# Patient Record
Sex: FEMALE | Race: WHITE | NOT HISPANIC OR LATINO | Employment: FULL TIME | URBAN - METROPOLITAN AREA
[De-identification: names, ages, dates, MRNs, and addresses within clinical notes are randomized per-mention and may not be internally consistent; named-entity substitution may affect disease eponyms.]

---

## 2017-03-14 ENCOUNTER — HOSPITAL ENCOUNTER (OUTPATIENT)
Dept: MAMMOGRAPHY | Facility: CLINIC | Age: 57
Discharge: HOME | End: 2017-03-14
Attending: FAMILY MEDICINE | Admitting: FAMILY MEDICINE
Payer: COMMERCIAL

## 2017-03-14 DIAGNOSIS — Z12.31 VISIT FOR SCREENING MAMMOGRAM: ICD-10-CM

## 2017-03-14 PROCEDURE — G0202 SCR MAMMO BI INCL CAD: HCPCS

## 2017-03-16 ENCOUNTER — TELEPHONE (OUTPATIENT)
Dept: FAMILY MEDICINE | Facility: CLINIC | Age: 57
End: 2017-03-16

## 2017-03-16 ENCOUNTER — HOSPITAL ENCOUNTER (OUTPATIENT)
Dept: ULTRASOUND IMAGING | Facility: CLINIC | Age: 57
Discharge: HOME | End: 2017-03-16
Attending: FAMILY MEDICINE | Admitting: FAMILY MEDICINE
Payer: COMMERCIAL

## 2017-03-16 DIAGNOSIS — R92.8 ABNORMAL MAMMOGRAM: ICD-10-CM

## 2017-03-16 PROCEDURE — 76642 ULTRASOUND BREAST LIMITED: CPT | Mod: RT

## 2017-03-16 NOTE — TELEPHONE ENCOUNTER
Reason for Call:  Other orders     Detailed comments: Diagnostics is calling because they need and MD to sign the Mammo orders they have placed   For breast ultrasound, pt diagnostic mammogram was abnormal      Phone Number Patient can be reached at: Other phone number:  [phone] Ambreen Sellers Time: any     Can we leave a detailed message on this number? YES    Call taken on 3/16/2017 at 8:09 AM by Sydnee Eaton

## 2017-03-17 ENCOUNTER — APPOINTMENT (OUTPATIENT)
Dept: GENERAL RADIOLOGY | Facility: CLINIC | Age: 57
End: 2017-03-17
Attending: PHYSICIAN ASSISTANT
Payer: COMMERCIAL

## 2017-03-17 ENCOUNTER — HOSPITAL ENCOUNTER (EMERGENCY)
Facility: CLINIC | Age: 57
Discharge: HOME | End: 2017-03-17
Attending: PHYSICIAN ASSISTANT | Admitting: PHYSICIAN ASSISTANT
Payer: COMMERCIAL

## 2017-03-17 VITALS
TEMPERATURE: 97.9 F | WEIGHT: 165 LBS | SYSTOLIC BLOOD PRESSURE: 116 MMHG | BODY MASS INDEX: 29.23 KG/M2 | OXYGEN SATURATION: 95 % | DIASTOLIC BLOOD PRESSURE: 83 MMHG | RESPIRATION RATE: 17 BRPM

## 2017-03-17 DIAGNOSIS — J20.9 ACUTE BRONCHITIS WITH SYMPTOMS > 10 DAYS: ICD-10-CM

## 2017-03-17 LAB
INTERNAL QC OK POCT: YES
S PYO AG THROAT QL IA.RAPID: NEGATIVE

## 2017-03-17 PROCEDURE — 87081 CULTURE SCREEN ONLY: CPT | Performed by: PHYSICIAN ASSISTANT

## 2017-03-17 PROCEDURE — 71020 XR CHEST 2 VW: CPT

## 2017-03-17 PROCEDURE — 99213 OFFICE O/P EST LOW 20 MIN: CPT | Performed by: PHYSICIAN ASSISTANT

## 2017-03-17 PROCEDURE — 99214 OFFICE O/P EST MOD 30 MIN: CPT | Mod: 25

## 2017-03-17 PROCEDURE — 87880 STREP A ASSAY W/OPTIC: CPT | Performed by: PHYSICIAN ASSISTANT

## 2017-03-17 RX ORDER — ALBUTEROL SULFATE 90 UG/1
2 AEROSOL, METERED RESPIRATORY (INHALATION) EVERY 4 HOURS PRN
Qty: 1 INHALER | Refills: 0 | Status: SHIPPED | OUTPATIENT
Start: 2017-03-17 | End: 2017-03-28

## 2017-03-17 RX ORDER — BENZONATATE 100 MG/1
100 CAPSULE ORAL 3 TIMES DAILY PRN
Qty: 42 CAPSULE | Refills: 0 | Status: SHIPPED | OUTPATIENT
Start: 2017-03-17 | End: 2017-03-28

## 2017-03-17 RX ORDER — DOXYCYCLINE 100 MG/1
100 CAPSULE ORAL 2 TIMES DAILY
Qty: 20 CAPSULE | Refills: 0 | Status: SHIPPED | OUTPATIENT
Start: 2017-03-17 | End: 2017-03-27

## 2017-03-17 RX ORDER — PREDNISONE 20 MG/1
TABLET ORAL
Qty: 10 TABLET | Refills: 0 | Status: SHIPPED | OUTPATIENT
Start: 2017-03-17 | End: 2017-03-28

## 2017-03-17 NOTE — ED AVS SNAPSHOT
[facility] Emergency Department    [address] [address]    Phone:  [phone]    Fax:  [phone]                                       Ivania Antonio   MRN: [id number]    Department:  [facility] Emergency Department   Date of Visit:  3/17/2017           After Visit Summary Signature Page     I have received my discharge instructions, and my questions have been answered. I have discussed any challenges I see with this plan with the nurse or doctor.    ..........................................................................................................................................  Patient/Patient Representative Signature      ..........................................................................................................................................  Patient Representative Print Name and Relationship to Patient    ..................................................               ................................................  Date                                            Time    ..........................................................................................................................................  Reviewed by Signature/Title    ...................................................              ..............................................  Date                                                            Time

## 2017-03-17 NOTE — ED AVS SNAPSHOT
[facility] Emergency Department    [address] [address]    Phone:  [phone]    Fax:  [phone]                                       Ivania Antonio   MRN: [id number]    Department:  [facility] Emergency Department   Date of Visit:  3/17/2017           Patient Information     Date Of Birth          1960        Your diagnoses for this visit were:     Acute bronchitis with symptoms > 10 days        You were seen by Peri Rodriguez PA-C.      Follow-up Information     Follow up with Elizabeth Smith MD In 5 days.    Specialty:  Family Practice    Why:  if no improvement or sooner if new or worsening symptoms     Contact information:    [facility] MED  [address] [address]  [phone]          Discharge Instructions         Bronchitis, Viral (Adult)    You have a viral bronchitis. Bronchitis is inflammation and swelling of the lining of the lungs. This is often caused by an infection. Symptoms include a dry, hacking cough that is worse at night. The cough may bring up yellow-green mucus. You may also feel short of breath or wheeze. Other symptoms may include tiredness, chest discomfort, and chills.  Bronchitis that is caused by a virus is not treated with antibiotics. Instead, medicines may be given to help relieve symptoms. Symptoms can last up to 2 weeks, although the cough may last much longer.  This illness is contagious during the first few days and is spread through the air by coughing and sneezing, or by direct contact (touching the sick person and then touching your own eyes, nose, or mouth).  Most viral illnesses resolve within 10 to 14 days with rest and simple home remedies, although they may sometimes last for several weeks.  Home care    If symptoms are severe, rest at home for the first 2 to 3 days. When you go back to your usual activities, don't let yourself get too tired.    Do not smoke. Also avoid being exposed to secondhand  smoke.    You may use over-the-counter medicine to control fever or pain, unless another pain medicine was prescribed. (Note: If you have chronic liver or kidney disease or have ever had a stomach ulcer or gastrointestinal bleeding, talk with your healthcare provider before using these medicines. Also talk to your provider if you are taking medicine to prevent blood clots.) Aspirin should never be given to anyone younger than 18 years of age who is ill with a viral infection or fever. It may cause severe liver or brain damage.    Your appetite may be poor, so a light diet is fine. Avoid dehydration by drinking 6 to 8 glasses of fluids per day (such as water, soft drinks, sports drinks, juices, tea, or soup). Extra fluids will help loosen secretions in the nose and lungs.    Over-the-counter cough, cold, and sore-throat medicines will not shorten the length of the illness, but they may help to reduce symptoms. (Note: Do not use decongestants if you have high blood pressure.)  Follow-up care  Follow up with your healthcare provider, or as advised.  If you had an X-ray or ECG (electrocardiogram), a specialist will review it. You will be notified of any new findings that may affect your care.  Note: If you are age 65 or older, or if you have a chronic lung disease or condition that affects your immune system, or you smoke, talk to your healthcare provider about having pneumococcal vaccinations and a yearly influenza vaccination (flu shot).  When to seek medical advice   Call your healthcare provider right away if any of these occur:    Fever of 100.4 F (38 C) or higher    Coughing up increased amounts of colored sputum    Weakness, drowsiness, headache, facial pain, ear pain, or a stiff neck  Call 911, or get immediate medical care  Contact emergency services right away if any of these occur:    Coughing up blood    Worsening weakness, drowsiness, headache, or stiff neck    Trouble breathing, wheezing, or pain with  breathing    1511-3237 [employer]. [address] [place] [address], [place] [address]. All rights reserved. This information is not intended as a substitute for professional medical care. Always follow your healthcare professional's instructions.          Future Appointments        Provider Department Dept Phone Center    3/21/2017 7:00 AM Elizabeth Smith MD [facility] [phone] [facility]      24 Hour Appointment Hotline       To make an appointment at any [facility], call [phone] ([phone]). If you don't have a family doctor or clinic, we will help you find one. [facility] are conveniently located to serve the needs of you and your family.             Review of your medicines      START taking        Dose / Directions Last dose taken    albuterol 108 (90 BASE) MCG/ACT Inhaler   Commonly known as:  albuterol   Dose:  2 puff   Quantity:  1 Inhaler        Inhale 2 puffs into the lungs every 4 hours as needed for shortness of breath / dyspnea   Refills:  0        benzonatate 100 MG capsule   Commonly known as:  TESSALON   Dose:  100 mg   Quantity:  42 capsule        Take 1 capsule (100 mg) by mouth 3 times daily as needed   Refills:  0        doxycycline 100 MG capsule   Commonly known as:  VIBRAMYCIN   Dose:  100 mg   Quantity:  20 capsule        Take 1 capsule (100 mg) by mouth 2 times daily for 10 days   Refills:  0        predniSONE 20 MG tablet   Commonly known as:  DELTASONE   Quantity:  10 tablet        Take two tablets (= 40mg) each day for 5 (five) days   Refills:  0          Our records show that you are taking the medicines listed below. If these are incorrect, please call your family doctor or clinic.        Dose / Directions Last dose taken    MULTIVITAMINS PO   Dose:  1 tablet        Take 1 tablet by mouth daily   Refills:  0                Prescriptions were sent or printed at these locations (4 Prescriptions)                   [facility] [place] -  [place], [place] - [address]   [address] [place] [address]    Telephone:  [phone]   Fax:  [phone]   Hours:                  E-Prescribed (3 of 4)         albuterol (ALBUTEROL) 108 (90 BASE) MCG/ACT Inhaler               benzonatate (TESSALON) 100 MG capsule               predniSONE (DELTASONE) 20 MG tablet                 Printed at Department/Unit printer (1 of 4)         doxycycline (VIBRAMYCIN) 100 MG capsule                Procedures and tests performed during your visit     Chest XR,  PA & LAT    Rapid strep group A screen POCT      Orders Needing Specimen Collection     Ordered          03/17/17 1319  Beta strep group A r/o culture - ROUTINE, Prio: Routine, Needs to be Collected     Scheduled Task Status   03/17/17 1320 Collect Beta strep group A r/o culture Open   Order Class:  PCU Collect                  Pending Results     Date and Time Order Name Status Description    3/17/2017 1307 Chest XR,  PA & LAT In process             Pending Culture Results     No orders found from 3/15/2017 to 3/18/2017.             Test Results from your hospital stay     3/17/2017  1:19 PM - Deisi Barraza LPN      Component Results     Component Value Ref Range & Units Status    Rapid Strep A Screen NEGATIVE neg Final    Internal QC OK Yes  Final         3/17/2017  1:11 PM - Katie Ray      Result not yet available     Exam Ended                Thank you for choosing [facility]       Thank you for choosing [facility] for your care. Our goal is always to provide you with excellent care. Hearing back from our patients is one way we can continue to improve our services. Please take a few minutes to complete the written survey that you may receive in the mail after you visit with us. Thank you!        [employer] Information     [employer] gives you secure access to your electronic health record. If you see a primary care provider, you can also send messages to your care team and make appointments. If you  have questions, please call your primary care clinic.  If you do not have a primary care provider, please call [phone] and they will assist you.        Care EveryWhere ID     This is your Care EveryWhere ID. This could be used by other organizations to access your [facility] medical records  [id number]        After Visit Summary       This is your record. Keep this with you and show to your community pharmacist(s) and doctor(s) at your next visit.

## 2017-03-17 NOTE — ED PROVIDER NOTES
History     Chief Complaint   Patient presents with     Cough     started 2 wks ago, not getting better      Pharyngitis     HPI  Ivania Antonio is a 56 year old female who is urgent care with concerns over 2 week history of cough.  She initially complains of sore throat, nasal congestion, intermittent fever up to 100 orally, shortness of breath and wheezing.  She has had some nausea however she is unsure if this is due to illness or side effect of medications that she subsequently control her symptoms.  She has not any actual vomiting, diarrhea or abdominal pain.  He is attempted to treat with Benadryl, OTC Vicks cold medication.  She denies any history of asthma, COPD or other pitting-related disorders.  She is a nonsmoker.  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    Past Medical History   Diagnosis Date     ASCUS with positive high risk HPV 4/5/2015     3/25/2015:Pap--ASCUS, +HR HPV. Plan [place]-      H/O colposcopy with cervical biopsy 4/17/2015     Bx & ECC - Negative     Hypertrophy of breast         No current facility-administered medications on file prior to encounter.   Current Outpatient Prescriptions on File Prior to Encounter:  Multiple Vitamin (MULTIVITAMINS PO) Take 1 tablet by mouth daily      I have reviewed the Medications, Allergies, Past Medical and Surgical History, and Social History in the Epic system.    Review of Systems  CONSTITUTIONAL:POSITIVE for intermittent fever up to 100.0 orally, chills, myalgias  INTEGUMENTARY/SKIN: NEGATIVE for worrisome rashes, moles or lesions  EYES: NEGATIVE for vision changes or irritation  ENT/MOUTH: POSITIVE for nasal congestion and sore throat NEGATIVE for ear pain   RESP:POSITIVE for cough, shortness of breath and wheezing   GI: POSITIVE for nausea NEGATIVE for vomiting, diarrhea or abdominal pain   Physical Exam   /83  Temp 97.9  F (36.6  C) (Oral)  Resp 17  Wt 74.8 kg (165 lb)  LMP 10/01/2012  SpO2 95%  BMI  29.23 kg/m2  Physical Exam  GENERAL APPEARANCE: alert and no distress  EYES: EOMI,  PERRL, conjunctiva clear  HENT: ear canals and TM's normal.  Nasal mucosa moist.  Posterior pharynx nonerythematous exudate  NECK: supple, nontender, no lymphadenopathy  RESP:fine crackles at bases that clear minimally with coughing, no wheezing, frequent cough present   CV: regular rates and rhythm, normal S1 S2, no murmur noted  SKIN: no suspicious lesions or rashes  ED Course     ED Course     Procedures        Critical Care time:  none            Labs Ordered and Resulted from Time of ED Arrival Up to the Time of Departure from the ED   RAPID STREP GROUP A SCREEN POCT   BETA STREP GROUP A CULTURE     Results for orders placed or performed during the hospital encounter of 03/17/17   Chest XR,  PA & LAT    Narrative    XR CHEST 2 VW 3/17/2017 1:19 PM    HISTORY: Cough.    COMPARISON: None.    FINDINGS: No airspace consolidation, pleural effusion or pneumothorax.  Normal heart size.      Impression    IMPRESSION: No acute cardiopulmonary abnormality.    JUAN BOWMAN MD     Assessments & Plan (with Medical Decision Making)     I have reviewed the nursing notes.    I have reviewed the findings, diagnosis, plan and need for follow up with the patient.  New Prescriptions    ALBUTEROL (ALBUTEROL) 108 (90 BASE) MCG/ACT INHALER    Inhale 2 puffs into the lungs every 4 hours as needed for shortness of breath / dyspnea    BENZONATATE (TESSALON) 100 MG CAPSULE    Take 1 capsule (100 mg) by mouth 3 times daily as needed    DOXYCYCLINE (VIBRAMYCIN) 100 MG CAPSULE    Take 1 capsule (100 mg) by mouth 2 times daily for 10 days    PREDNISONE (DELTASONE) 20 MG TABLET    Take two tablets (= 40mg) each day for 5 (five) days     Final diagnoses:   Acute bronchitis with symptoms > 10 days     56-year-old female presents to urgent care with concerns over 2 week history of cough accompanied by sore throat, intermittent fever up to 100.0 orally.  She was  tachycardic upon arrival however heart rate had normalized by time of examination.  Remainder of vital signs were within normal limits.  Physical exam findings as described above.  As part of evaluation she did have negative rapid strep test with culture pending at time of discharge.  Chest x-ray was negative for acute infiltrate, pneumothorax, pleural effusion or change in cardio pulmonary vasculature.  Symptoms are most consistent with bronchitis.  Differential also included viral URI, would consider possibility of influenza given presence of fever.  I do not suspect bacterial sinusitis,  Pertussis, PE or CHF at this time.  Patient was instructed to gently palpated in the viral infection and she was discharged home stable with injections for symptomatic treatment with albuterol, Tessalon and prednisone.  Given duration of her symptoms, I did also agree to give her prescription for doxycycline to be started only if no improvement within the next 3-5 days.  She was instructed to follow-up with primary care provider improved within the next week.  Signs of respiratory distress, worrisome reasons to return to the ER/UC sooner discussed.    Disclaimer: This note consists of symbols derived from keyboarding, dictation, and/or voice recognition software. As a result, there may be errors in the script that have gone undetected.  Please consider this when interpreting information found in the chart.      3/17/2017   [facility] EMERGENCY DEPARTMENT     Peri Rodriguez PA-C  03/17/17 2472

## 2017-03-17 NOTE — DISCHARGE INSTRUCTIONS

## 2017-03-17 NOTE — LETTER
[facility] EMERGENCY DEPARTMENT  [address] [address]  Phone: [phone]  Fax: [phone]    March 17, 2017        Ivania Antonio  [address] [place] [address]          To whom it may concern:    RE: Ivania Redd was evaluated in the urgent care for illness 3/17/17. She may return to activity only as toelrated as long as she remains afebrile with a temp less than 100.0 orally.    Please contact me for questions or concerns.      Sincerely,        Peri Rodriguez PA-C

## 2017-03-19 LAB
BACTERIA SPEC CULT: NORMAL
MICRO REPORT STATUS: NORMAL
SPECIMEN SOURCE: NORMAL

## 2017-03-28 ENCOUNTER — OFFICE VISIT (OUTPATIENT)
Dept: FAMILY MEDICINE | Facility: CLINIC | Age: 57
End: 2017-03-28
Payer: COMMERCIAL

## 2017-03-28 VITALS
DIASTOLIC BLOOD PRESSURE: 74 MMHG | BODY MASS INDEX: 30.37 KG/M2 | HEIGHT: 63 IN | HEART RATE: 80 BPM | WEIGHT: 171.4 LBS | SYSTOLIC BLOOD PRESSURE: 101 MMHG

## 2017-03-28 DIAGNOSIS — Z83.3 FAMILY HISTORY OF DIABETES MELLITUS: ICD-10-CM

## 2017-03-28 DIAGNOSIS — Z11.59 NEED FOR HEPATITIS C SCREENING TEST: ICD-10-CM

## 2017-03-28 DIAGNOSIS — Z00.00 ROUTINE GENERAL MEDICAL EXAMINATION AT A HEALTH CARE FACILITY: Primary | ICD-10-CM

## 2017-03-28 LAB
ANION GAP SERPL CALCULATED.3IONS-SCNC: 6 MMOL/L (ref 3–14)
BUN SERPL-MCNC: 17 MG/DL (ref 7–30)
CALCIUM SERPL-MCNC: 9.3 MG/DL (ref 8.5–10.1)
CHLORIDE SERPL-SCNC: 104 MMOL/L (ref 94–109)
CO2 SERPL-SCNC: 31 MMOL/L (ref 20–32)
CREAT SERPL-MCNC: 0.87 MG/DL (ref 0.52–1.04)
GFR SERPL CREATININE-BSD FRML MDRD: 67 ML/MIN/1.7M2
GLUCOSE SERPL-MCNC: 96 MG/DL (ref 70–99)
HCV AB SERPL QL IA: NORMAL
POTASSIUM SERPL-SCNC: 4.3 MMOL/L (ref 3.4–5.3)
SODIUM SERPL-SCNC: 141 MMOL/L (ref 133–144)

## 2017-03-28 PROCEDURE — 87624 HPV HI-RISK TYP POOLED RSLT: CPT | Performed by: FAMILY MEDICINE

## 2017-03-28 PROCEDURE — 36415 COLL VENOUS BLD VENIPUNCTURE: CPT | Performed by: FAMILY MEDICINE

## 2017-03-28 PROCEDURE — 80048 BASIC METABOLIC PNL TOTAL CA: CPT | Performed by: FAMILY MEDICINE

## 2017-03-28 PROCEDURE — 99396 PREV VISIT EST AGE 40-64: CPT | Performed by: FAMILY MEDICINE

## 2017-03-28 PROCEDURE — 82274 ASSAY TEST FOR BLOOD FECAL: CPT | Performed by: FAMILY MEDICINE

## 2017-03-28 PROCEDURE — G0145 SCR C/V CYTO,THINLAYER,RESCR: HCPCS | Performed by: FAMILY MEDICINE

## 2017-03-28 PROCEDURE — 86803 HEPATITIS C AB TEST: CPT | Performed by: FAMILY MEDICINE

## 2017-03-28 NOTE — MR AVS SNAPSHOT
After Visit Summary   3/28/2017    Ivania Antonio    MRN: [id number]           Patient Information     Date Of Birth          1960        Visit Information        Provider Department      3/28/2017 7:00 AM Elizabeth Smith MD [facility]        Today's Diagnoses     Routine general medical examination at a health care facility    -  1    Family history of diabetes mellitus        Need for hepatitis C screening test          Care Instructions      Preventive Health Recommendations  Female Ages 50 - 64    Yearly exam: See your health care provider every year in order to  o Review health changes.   o Discuss preventive care.    o Review your medicines if your doctor has prescribed any.      Get a Pap test every three years (unless you have an abnormal result and your provider advises testing more often).    If you get Pap tests with HPV test, you only need to test every 5 years, unless you have an abnormal result.     You do not need a Pap test if your uterus was removed (hysterectomy) and you have not had cancer.    You should be tested each year for STDs (sexually transmitted diseases) if you're at risk.     Have a mammogram every 1 to 2 years.    Have a colonoscopy at age 50, or have a yearly FIT test (stool test). These exams screen for colon cancer.      Have a cholesterol test every 5 years, or more often if advised.    Have a diabetes test (fasting glucose) every three years. If you are at risk for diabetes, you should have this test more often.     If you are at risk for osteoporosis (brittle bone disease), think about having a bone density scan (DEXA).    Shots: Get a flu shot each year. Get a tetanus shot every 10 years.    Nutrition:     Eat at least 5 servings of fruits and vegetables each day.    Eat whole-grain bread, whole-wheat pasta and brown rice instead of white grains and rice.    Talk to your provider about Calcium and Vitamin D.     Lifestyle    Exercise at  "least 150 minutes a week (30 minutes a day, 5 days a week). This will help you control your weight and prevent disease.    Limit alcohol to one drink per day.    No smoking.     Wear sunscreen to prevent skin cancer.     See your dentist every six months for an exam and cleaning.    See your eye doctor every 1 to 2 years.          Follow-ups after your visit        Who to contact     If you have questions or need follow up information about today's clinic visit or your schedule please contact [facility] directly at [phone].  Normal or non-critical lab and imaging results will be communicated to you by [employer], letter or phone within 4 business days after the clinic has received the results. If you do not hear from us within 7 days, please contact the clinic through [employer] or phone. If you have a critical or abnormal lab result, we will notify you by phone as soon as possible.  Submit refill requests through [employer] or call your pharmacy and they will forward the refill request to us. Please allow 3 business days for your refill to be completed.          Additional Information About Your Visit        [employer] Information     [employer] gives you secure access to your electronic health record. If you see a primary care provider, you can also send messages to your care team and make appointments. If you have questions, please call your primary care clinic.  If you do not have a primary care provider, please call [phone] and they will assist you.        Care EveryWhere ID     This is your Care EveryWhere ID. This could be used by other organizations to access your [facility] medical records  [id number]        Your Vitals Were     Pulse Height Last Period BMI (Body Mass Index)          80 5' 3\" (1.6 m) 10/01/2012 30.36 kg/m2         Blood Pressure from Last 3 Encounters:   03/28/17 101/74   03/17/17 116/83   12/07/15 145/86    Weight from Last 3 Encounters:   03/28/17 171 lb 6.4 oz (77.7 kg) "   03/17/17 165 lb (74.8 kg)   04/17/15 183 lb (83 kg)              We Performed the Following     Basic metabolic panel     Hepatitis C antibody        Primary Care Provider Office Phone # Fax #    Elizabeth Allie Smith [phone]       [facility] [address] [address]        Thank you!     Thank you for choosing [facility]  for your care. Our goal is always to provide you with excellent care. Hearing back from our patients is one way we can continue to improve our services. Please take a few minutes to complete the written survey that you may receive in the mail after your visit with us. Thank you!             Your Updated Medication List - Protect others around you: Learn how to safely use, store and throw away your medicines at www.disposemymeds.org.          This list is accurate as of: 3/28/17  7:37 AM.  Always use your most recent med list.                   Brand Name Dispense Instructions for use    DOXYCYCLINE HYCLATE PO      Take 100 mg by mouth 2 times daily       MULTIVITAMINS PO      Take 1 tablet by mouth daily

## 2017-03-28 NOTE — NURSING NOTE
"Chief Complaint   Patient presents with     Gyn Exam     update pap smear     Physical       Initial /74  Pulse 80  Ht 5' 3\" (1.6 m)  Wt 171 lb 6.4 oz (77.7 kg)  LMP 10/01/2012  BMI 30.36 kg/m2 Estimated body mass index is 30.36 kg/(m^2) as calculated from the following:    Height as of this encounter: 5' 3\" (1.6 m).    Weight as of this encounter: 171 lb 6.4 oz (77.7 kg).  Medication Reconciliation: complete  "

## 2017-03-28 NOTE — PROGRESS NOTES
Answers for HPI/ROS submitted by the patient on 3/24/2017   Annual Exam:  Getting at least 3 servings of Calcium per day:: Yes  Bi-annual eye exam:: Yes  Dental care twice a year:: Yes  Sleep apnea or symptoms of sleep apnea:: None  Diet:: Regular (no restrictions)  Frequency of exercise:: 4-5 days/week  Taking medications regularly:: Yes  Medication side effects:: Not applicable  Additional concerns today:: No  Q1: Little interest or pleasure in doing things: 0=Not at all  Q2: Feeling down, depressed or hopeless: 0=Not at all  PHQ-2 Score: 0  Duration of exercise:: 30-45 minutes     SUBJECTIVE:     CC: Ivania Antonio is an 56 year old woman who presents for preventive health visit.       Today's PHQ-2 Score:   PHQ-2 ( 1999 [employer]) 3/28/2017 3/24/2017   Q1: Little interest or pleasure in doing things 0 -   Q2: Feeling down, depressed or hopeless 0 -   PHQ-2 Score 0 -   Little interest or pleasure in doing things - Not at all   Feeling down, depressed or hopeless - Not at all   PHQ-2 Score - 0         Social History   Substance Use Topics     Smoking status: Never Smoker     Smokeless tobacco: Never Used     Alcohol use Yes      Comment: ocass. 4 beers a month         Recent Labs   Lab Test  03/25/15   0724   CHOL  154   HDL  68   LDL  73   TRIG  63   CHOLHDLRATIO  2.3     The 10-year ASCVD risk score (Strangtamanna WALDEN Jr, et al., 2013) is: 0.8%    Values used to calculate the score:      Age: 56 years      Sex: Female      Is Non- : No      Diabetic: No      Tobacco smoker: No      Systolic Blood Pressure: 101 mmHg      Is BP treated: No      HDL Cholesterol: 68 mg/dL      Total Cholesterol: 154 mg/dL    Reviewed orders with patient.  Reviewed health maintenance and updated orders accordingly - Yes    Mammo Decision Support:  Patient over age 50, mutual decision to screen reflected in health maintenance.    Pertinent mammograms are reviewed under the imaging tab.  History of abnormal Pap smear:    Last 3 Pap Results:   PAP (no units)   Date Value   2015 ASC-US (A)       Reviewed and updated as needed this visit by clinical staff  Tobacco  Allergies  Med Hx  Surg Hx  Fam Hx  Soc Hx        Reviewed and updated as needed this visit by Provider        Past Medical History:   Diagnosis Date     ASCUS with positive high risk HPV 2015    3/25/2015:Pap--ASCUS, +HR HPV. Plan [place]-      H/O colposcopy with cervical biopsy 2015    Bx & ECC - Negative     Hypertrophy of breast       Past Surgical History:   Procedure Laterality Date     BIOPSY BREAST Left 2015    Procedure: BIOPSY BREAST;  Surgeon: Otoniel Jimenez MD;  Location: [facility]     GALLBLADDER SURGERY  age 31    laparoscopic     MAMMOPLASTY REDUCTION BILATERAL  2014    Procedure: MAMMOPLASTY REDUCTION BILATERAL;  Surgeon: Sue Guerra MD;  Location: [facility] OR     Obstetric History       T0      TAB0   SAB0   E0   M0   L0           ROS:  C: NEGATIVE for fever, chills, change in weight  I: NEGATIVE for worrisome rashes, moles or lesions  E: NEGATIVE for vision changes or irritation  ENT: NEGATIVE for ear, mouth and throat problems  R: NEGATIVE for significant cough or SOB  B: NEGATIVE for masses, tenderness or discharge  CV: NEGATIVE for chest pain, palpitations or peripheral edema  GI: NEGATIVE for nausea, abdominal pain, heartburn, or change in bowel habits  : NEGATIVE for unusual urinary or vaginal symptoms. No vaginal bleeding.  M: NEGATIVE for significant arthralgias or myalgia  N: NEGATIVE for weakness, dizziness or paresthesias  P: NEGATIVE for changes in mood or affect     BP Readings from Last 3 Encounters:   17 101/74   17 116/83   12/07/15 145/86    Wt Readings from Last 3 Encounters:   17 171 lb 6.4 oz (77.7 kg)   17 165 lb (74.8 kg)   04/17/15 183 lb (83 kg)                  Patient Active Problem List   Diagnosis     Hypertrophy of breast     CARDIOVASCULAR SCREENING;  "LDL GOAL LESS THAN 160     Scar condition and fibrosis of skin     Lump or mass in breast     S/P bilateral breast reduction     History of bilateral breast reduction surgery     ASCUS with positive high risk HPV     Vaginal atrophy     Past Surgical History:   Procedure Laterality Date     BIOPSY BREAST Left 4/2/2015    Procedure: BIOPSY BREAST;  Surgeon: Otoniel Jimenez MD;  Location: [facility]     GALLBLADDER SURGERY  age 31    laparoscopic     MAMMOPLASTY REDUCTION BILATERAL  5/20/2014    Procedure: MAMMOPLASTY REDUCTION BILATERAL;  Surgeon: Sue Guerra MD;  Location: [facility]       Social History   Substance Use Topics     Smoking status: Never Smoker     Smokeless tobacco: Never Used     Alcohol use Yes      Comment: ocass. 4 beers a month     Family History   Problem Relation Age of Onset     CANCER Mother      lung, smoker     HEART DISEASE Father      DIABETES Maternal Grandfather      HEART DISEASE Paternal Grandfather      HEART DISEASE Sister      DIABETES Sister      Myocardial Infarction Brother          Current Outpatient Prescriptions   Medication Sig Dispense Refill     DOXYCYCLINE HYCLATE PO Take 100 mg by mouth 2 times daily       Multiple Vitamin (MULTIVITAMINS PO) Take 1 tablet by mouth daily        Allergies   Allergen Reactions     Penicillins Rash     LW Reaction: Rash, Generalized     Recent Labs   Lab Test  03/28/17   0738  03/25/15   0724   LDL   --   73   HDL   --   68   TRIG   --   63   CR  0.87   --    GFRESTIMATED  67   --    GFRESTBLACK  82   --    POTASSIUM  4.3   --       OBJECTIVE:     /74  Pulse 80  Ht 5' 3\" (1.6 m)  Wt 171 lb 6.4 oz (77.7 kg)  LMP 10/01/2012  BMI 30.36 kg/m2  EXAM:  GENERAL APPEARANCE: healthy, alert and no distress  EYES: Eyes grossly normal to inspection, PERRL and conjunctivae and sclerae normal  HENT: ear canals and TM's normal, nose and mouth without ulcers or lesions, oropharynx clear and oral mucous membranes moist  NECK: no " "adenopathy, no asymmetry, masses, or scars and thyroid normal to palpation  RESP: lungs clear to auscultation - no rales, rhonchi or wheezes  BREAST: normal without masses, tenderness or nipple discharge and no palpable axillary masses or adenopathy  CV: regular rate and rhythm, normal S1 S2, no S3 or S4, no murmur, click or rub, no peripheral edema and peripheral pulses strong  ABDOMEN: soft, nontender, no hepatosplenomegaly, no masses and bowel sounds normal   (female): normal female external genitalia, normal urethral meatus, vaginal mucosal atrophy noted and normal cervix, adnexae, and uterus without masses.  MS: no musculoskeletal defects are noted and gait is age appropriate without ataxia  SKIN: no suspicious lesions or rashes  NEURO: Normal strength and tone, sensory exam grossly normal, mentation intact and speech normal  PSYCH: mentation appears normal and affect normal/bright    ASSESSMENT/PLAN:     1. Routine general medical examination at a health care facility  Low risk cervical cancer, low risk breast cancer.  - Pap imaged thin layer screen with HPV - recommended age 30 - 65 years (select HPV order below)  - HPV High Risk Types DNA Cervical    2. Family history of diabetes mellitus  Yearly glucose  - Basic metabolic panel    3. Need for hepatitis C screening test  - Hepatitis C antibody    COUNSELING:   Reviewed preventive health counseling, as reflected in patient instructions         reports that she has never smoked. She has never used smokeless tobacco.    Estimated body mass index is 30.36 kg/(m^2) as calculated from the following:    Height as of this encounter: 5' 3\" (1.6 m).    Weight as of this encounter: 171 lb 6.4 oz (77.7 kg).   Weight management plan: Discussed healthy diet and exercise guidelines and patient will follow up in 3 months in clinic to re-evaluate.    Counseling Resources:  ATP IV Guidelines  Pooled Cohorts Equation Calculator  Breast Cancer Risk Calculator  FRAX Risk " Assessment  ICSI Preventive Guidelines  Dietary Guidelines for Americans, 2010  USDA's MyPlate  ASA Prophylaxis  Lung CA Screening    Elizabeth Smith MD  [facility]

## 2017-03-30 LAB
COPATH REPORT: NORMAL
PAP: NORMAL

## 2017-03-31 LAB
FINAL DIAGNOSIS: NORMAL
HPV HR 12 DNA CVX QL NAA+PROBE: NEGATIVE
HPV16 DNA SPEC QL NAA+PROBE: NEGATIVE
HPV18 DNA SPEC QL NAA+PROBE: NEGATIVE
SPECIMEN DESCRIPTION: NORMAL

## 2017-04-02 LAB — HEMOCCULT STL QL IA: NEGATIVE

## 2017-04-17 ENCOUNTER — HOSPITAL ENCOUNTER (EMERGENCY)
Facility: CLINIC | Age: 57
Discharge: HOME | End: 2017-04-17
Attending: PHYSICIAN ASSISTANT | Admitting: PHYSICIAN ASSISTANT
Payer: COMMERCIAL

## 2017-04-17 VITALS
TEMPERATURE: 97.8 F | OXYGEN SATURATION: 99 % | RESPIRATION RATE: 18 BRPM | SYSTOLIC BLOOD PRESSURE: 122 MMHG | DIASTOLIC BLOOD PRESSURE: 84 MMHG

## 2017-04-17 DIAGNOSIS — J01.90 ACUTE SINUSITIS WITH SYMPTOMS > 10 DAYS: ICD-10-CM

## 2017-04-17 PROCEDURE — 99213 OFFICE O/P EST LOW 20 MIN: CPT | Performed by: PHYSICIAN ASSISTANT

## 2017-04-17 PROCEDURE — 99213 OFFICE O/P EST LOW 20 MIN: CPT

## 2017-04-17 RX ORDER — FLUTICASONE PROPIONATE 50 MCG
1-2 SPRAY, SUSPENSION (ML) NASAL DAILY PRN
Qty: 1 BOTTLE | Refills: 0 | Status: SHIPPED | OUTPATIENT
Start: 2017-04-17 | End: 2019-01-29

## 2017-04-17 RX ORDER — LEVOFLOXACIN 500 MG/1
500 TABLET, FILM COATED ORAL DAILY
Qty: 7 TABLET | Refills: 0 | Status: SHIPPED | OUTPATIENT
Start: 2017-04-17 | End: 2017-04-24

## 2017-04-17 RX ORDER — LEVOFLOXACIN 500 MG/1
500 TABLET, FILM COATED ORAL DAILY
Qty: 7 TABLET | Refills: 0 | Status: SHIPPED | OUTPATIENT
Start: 2017-04-17 | End: 2017-04-17 | Stop reason: CLARIF

## 2017-04-17 NOTE — ED AVS SNAPSHOT
[facility] Emergency Department    [address] [address]    Phone:  [phone]    Fax:  [phone]                                       Ivania Antonio   MRN: [id number]    Department:  [facility] Department   Date of Visit:  4/17/2017           Patient Information     Date Of Birth          1960        Your diagnoses for this visit were:     Acute sinusitis with symptoms > 10 days        You were seen by Mary Daniels PA-C.      Follow-up Information     Please follow up.    Why:  As needed, If symptoms worsen        Discharge Instructions       Use Medication as directed    Patient informed to rest, warm compresses over sinuses as needed, stay hydrated, cool humidifier, salt water gargles, and nasal saline sprays as needed.  Lots of rest and fluids.  Tylenol or ibuprofen as needed.    Return if any worsening symptoms or if not improving.  Follow up with primary care provider in 1-2 weeks if symptoms persist.    Go to Emergency Room if sx worsen or change, worst headache of life, visual changes, confusion, fevers > 104F occur.     Patient voiced understanding of instructions given.       24 Hour Appointment Hotline       To make an appointment at any [facility], call [phone] ([phone]). If you don't have a family doctor or clinic, we will help you find one. [facility] clinics are conveniently located to serve the needs of you and your family.             Review of your medicines      START taking        Dose / Directions Last dose taken    fluticasone 50 MCG/ACT spray   Commonly known as:  FLONASE   Dose:  1-2 spray   Quantity:  1 Bottle        Spray 1-2 sprays into both nostrils daily as needed for rhinitis or allergies   Refills:  0        levofloxacin 500 MG tablet   Commonly known as:  LEVAQUIN   Dose:  500 mg   Quantity:  7 tablet        Take 1 tablet (500 mg) by mouth daily for 7 days   Refills:  0          Our records show that you  are taking the medicines listed below. If these are incorrect, please call your family doctor or clinic.        Dose / Directions Last dose taken    DOXYCYCLINE HYCLATE PO   Dose:  100 mg        Take 100 mg by mouth 2 times daily   Refills:  0        MULTIVITAMINS PO   Dose:  1 tablet        Take 1 tablet by mouth daily   Refills:  0                Prescriptions were sent or printed at these locations (2 Prescriptions)                   [facility] [address] [address] [employer] - [place] [address] [address] [facility] [address]    Telephone:  [phone]   Fax:  [phone]   Hours:                  E-Prescribed (2 of 2)         levofloxacin (LEVAQUIN) 500 MG tablet               fluticasone (FLONASE) 50 MCG/ACT spray                Orders Needing Specimen Collection     None      Pending Results     No orders found from 4/15/2017 to 4/18/2017.            Pending Culture Results     No orders found from 4/15/2017 to 4/18/2017.            Test Results From Your Hospital Stay               Thank you for choosing [facility]       Thank you for choosing [facility] for your care. Our goal is always to provide you with excellent care. Hearing back from our patients is one way we can continue to improve our services. Please take a few minutes to complete the written survey that you may receive in the mail after you visit with us. Thank you!        [employer] Information     [employer] gives you secure access to your electronic health record. If you see a primary care provider, you can also send messages to your care team and make appointments. If you have questions, please call your primary care clinic.  If you do not have a primary care provider, please call [phone] and they will assist you.        Care EveryWhere ID     This is your Care EveryWhere ID. This could be used by other organizations to access your [facility] medical records  [id number]        After Visit Summary       This is your record. Keep this with  you and show to your community pharmacist(s) and doctor(s) at your next visit.

## 2017-04-17 NOTE — DISCHARGE INSTRUCTIONS
Use Medication as directed    Patient informed to rest, warm compresses over sinuses as needed, stay hydrated, cool humidifier, salt water gargles, and nasal saline sprays as needed.  Lots of rest and fluids.  Tylenol or ibuprofen as needed.    Return if any worsening symptoms or if not improving.  Follow up with primary care provider in 1-2 weeks if symptoms persist.    Go to Emergency Room if sx worsen or change, worst headache of life, visual changes, confusion, fevers > 104F occur.     Patient voiced understanding of instructions given.

## 2017-04-17 NOTE — ED AVS SNAPSHOT
[facility]    [address] [address]    Phone:  [phone]    Fax:  [phone]                                       Ivania Antonio   MRN: [id number]    Department:  [facility]   Date of Visit:  4/17/2017           After Visit Summary Signature Page     I have received my discharge instructions, and my questions have been answered. I have discussed any challenges I see with this plan with the nurse or doctor.    ..........................................................................................................................................  Patient/Patient Representative Signature      ..........................................................................................................................................  Patient Representative Print Name and Relationship to Patient    ..................................................               ................................................  Date                                            Time    ..........................................................................................................................................  Reviewed by Signature/Title    ...................................................              ..............................................  Date                                                            Time

## 2017-04-17 NOTE — ED PROVIDER NOTES
History     Chief Complaint   Patient presents with     Sinusitis     for 2 weeks      HPI  Ivania Antonio is a 56 year old female who presents to urgent care with...    ENT Symptoms             Symptoms: cc Present Absent Comment   Fever/Chills   x    Fatigue   x    Muscle Aches   x    Eye Irritation   x    Sneezing  x     Nasal Jonathan/Drg  x     Sinus Pressure/Pain  x     Loss of smell  x     Dental pain   x    Sore Throat   x    Swollen Glands   x    Ear Pain/Fullness   x    Cough  x     Wheeze   x    Chest Pain   x    Shortness of breath   x    Rash   x    Other   x  Sinus headache     Symptom duration:  2 weeks   Symptom severity:  mild and worsening   Treatments tried:  patient finished doxycyline 2 weeks ago for bronchitis   Contacts:  none     Problem list, Medication list, Allergies, and Medical/Social/Surgical histories reviewed in [facility] and updated as appropriate.    Review of Systems     All normal unless stated above     Physical Exam   BP: 122/84  Heart Rate: 80  Temp: 97.8  F (36.6  C)  Resp: 18  SpO2: 99 %  Physical Exam     Constitutional: healthy, alert and no distress   Cardiovascular: RRR. No murmurs, clicks gallops or rub  Respiratory: Lungs clear to auscultation bilaterally. No rales, rhonchi, wheezing appreciated. No accessory muscle use or retractions.   ENT: bilateral TM normal without fluid or infection, neck has bilateral anterior cervical nodes enlarged, throat normal without erythema or exudate, maxillary and frontal sinus tender, post nasal drip noted and nasal mucosa congested  SKIN: no suspicious lesions or rashes    No results found for this or any previous visit (from the past 24 hour(s)).    ED Course     ED Course     Procedures            Critical Care time:  none               Labs Ordered and Resulted from Time of ED Arrival Up to the Time of Departure from the ED - No data to display    Assessments & Plan (with Medical Decision Making)     I have reviewed the nursing  notes.    I have reviewed the findings, diagnosis, plan and need for follow up with the patient.    New Prescriptions    FLUTICASONE (FLONASE) 50 MCG/ACT SPRAY    Spray 1-2 sprays into both nostrils daily as needed for rhinitis or allergies    LEVOFLOXACIN (LEVAQUIN) 500 MG TABLET    Take 1 tablet (500 mg) by mouth daily for 7 days       Final diagnoses:   Acute sinusitis with symptoms > 10 days       4/17/2017   [facility] EMERGENCY DEPARTMENT     Mary Daniels PA-C  04/17/17 [id number]

## 2017-08-13 ENCOUNTER — HOSPITAL ENCOUNTER (EMERGENCY)
Facility: CLINIC | Age: 57
Discharge: HOME | End: 2017-08-13
Attending: NURSE PRACTITIONER | Admitting: NURSE PRACTITIONER
Payer: COMMERCIAL

## 2017-08-13 VITALS
OXYGEN SATURATION: 97 % | TEMPERATURE: 98.5 F | HEART RATE: 91 BPM | BODY MASS INDEX: 30.82 KG/M2 | RESPIRATION RATE: 14 BRPM | DIASTOLIC BLOOD PRESSURE: 99 MMHG | SYSTOLIC BLOOD PRESSURE: 155 MMHG | WEIGHT: 174 LBS

## 2017-08-13 DIAGNOSIS — J06.9 UPPER RESPIRATORY TRACT INFECTION, UNSPECIFIED TYPE: Primary | ICD-10-CM

## 2017-08-13 LAB
INTERNAL QC OK POCT: YES
S PYO AG THROAT QL IA.RAPID: NEGATIVE

## 2017-08-13 PROCEDURE — 87880 STREP A ASSAY W/OPTIC: CPT | Performed by: NURSE PRACTITIONER

## 2017-08-13 PROCEDURE — 87081 CULTURE SCREEN ONLY: CPT | Performed by: NURSE PRACTITIONER

## 2017-08-13 PROCEDURE — 99213 OFFICE O/P EST LOW 20 MIN: CPT | Performed by: NURSE PRACTITIONER

## 2017-08-13 PROCEDURE — 99213 OFFICE O/P EST LOW 20 MIN: CPT

## 2017-08-13 RX ORDER — LEVOFLOXACIN 500 MG/1
500 TABLET, FILM COATED ORAL DAILY
Qty: 7 TABLET | Refills: 0 | Status: SHIPPED | OUTPATIENT
Start: 2017-08-13 | End: 2017-08-20

## 2017-08-13 NOTE — ED AVS SNAPSHOT
[facility] Emergency Department    [address] [address]    Phone:  [phone]    Fax:  [phone]                                       Ivania Antonio   MRN: [id number]    Department:  [facility] Emergency Department   Date of Visit:  8/13/2017           After Visit Summary Signature Page     I have received my discharge instructions, and my questions have been answered. I have discussed any challenges I see with this plan with the nurse or doctor.    ..........................................................................................................................................  Patient/Patient Representative Signature      ..........................................................................................................................................  Patient Representative Print Name and Relationship to Patient    ..................................................               ................................................  Date                                            Time    ..........................................................................................................................................  Reviewed by Signature/Title    ...................................................              ..............................................  Date                                                            Time

## 2017-08-13 NOTE — ED PROVIDER NOTES
History     Chief Complaint   Patient presents with     Pharyngitis     cough cold sx ST X2 weeks.      HPI  Ivania Antonio is a 57 year old female who fever, swollen glands, earache, sore throat, intermittent chills ( in the afternoon ), nasal congestion that started this past Wednesday.  Pt states occasionally shortness of air when going up stairs.  Pt states the fever has been low grade and the highest is 100 and usually in the afternoon the temp is noted.  Pt reports she has had a hoarse voice for approximately 3 weeks. Pt denies problems eating, drinking, voiding, and stooling.  Pt denies seizures, rashes, recent tick bites.      I have reviewed the Medications, Allergies, Past Medical and Surgical History, and Social History in the Epic system.    Review of Systems  10 point ROS of systems including Constitutional, Eyes, Respiratory, Cardiovascular, Gastroenterology, Genitourinary, Integumentary, Muscularskeletal, Psychiatric were all negative except for pertinent positives noted in my HPI.    Physical Exam   BP: (!) 155/99  Pulse: 91  Temp: 98.5  F (36.9  C)  Resp: 14  Weight: 78.9 kg (174 lb)  SpO2: 97 %  Physical Exam   Constitutional: She appears well-developed and well-nourished. She is cooperative. She appears ill (mildly ill appearing).   HENT:   Head: Normocephalic and atraumatic.   Right Ear: Hearing, tympanic membrane, external ear and ear canal normal.   Left Ear: Hearing, tympanic membrane, external ear and ear canal normal.   Nose: Mucosal edema and rhinorrhea (mucopurulent) present. No nose lacerations or sinus tenderness. Right sinus exhibits no maxillary sinus tenderness and no frontal sinus tenderness. Left sinus exhibits no frontal sinus tenderness.   Mouth/Throat: Uvula is midline, oropharynx is clear and moist and mucous membranes are normal.   Neurological: She is alert.   Skin: She is not diaphoretic.       ED Course     ED Course     Procedures      Labs Ordered and Resulted from Time  of ED Arrival Up to the Time of Departure from the ED   RAPID STREP GROUP A SCREEN POCT     Results for orders placed or performed during the hospital encounter of 08/13/17   Rapid strep group A screen POCT   Result Value Ref Range    Rapid Strep A Screen negative neg    Internal QC OK Yes        Assessments & Plan (with Medical Decision Making)     I have reviewed the nursing notes.    I have reviewed the findings, diagnosis, plan and need for follow up with the patient.  Ivania Antonio is a 57 year old female who fever, swollen glands, earache, sore throat, intermittent chills ( in the afternoon ), nasal congestion that started this past Wednesday.  Pt states occasionally shortness of air when going up stairs.  Pt states the fever has been low grade and the highest is 100 and usually in the afternoon the temp is noted.  Pt reports she has had a hoarse voice for approximately 3 weeks. Pt denies problems eating, drinking, voiding, and stooling.  Pt denies seizures, rashes, recent tick bites.  Exam as noted above.  Due to presentation of viral type findings initially with change noted over the past few days will treat as bacterial.  Previous reaction to PCN was generalized swelling moderate to severe and chose levaquin for antibiotic then.  DDx:  bronchitis, pneumonia, Viral URI (ie.. Influenza), sinusitis    Discharge Medication List as of 8/13/2017  1:01 PM      START taking these medications    Details   levofloxacin (LEVAQUIN) 500 MG tablet Take 1 tablet (500 mg) by mouth daily for 7 days, Disp-7 tablet, R-0, E-Prescribe             Final diagnoses:   Upper respiratory tract infection, unspecified type       8/13/2017   [facility] EMERGENCY DEPARTMENT     Lenka Verdin, ISAAC CNP  08/13/17 [id number]

## 2017-08-13 NOTE — ED AVS SNAPSHOT
[facility] Emergency Department    [address] [address]    Phone:  [phone]    Fax:  [phone]                                       Ivania Antonio   MRN: [id number]    Department:  [facility] Emergency Department   Date of Visit:  8/13/2017           Patient Information     Date Of Birth          1960        Your diagnoses for this visit were:     Upper respiratory tract infection, unspecified type        You were seen by Lenka Veridn APRN CNP.      Follow-up Information     Follow up with Elizabeth Smith MD In 3 days.    Specialty:  Family Practice    Why:  If symptoms worsen, As needed    Contact information:    [address] [address]  [phone]        Discharge References/Attachments     COLDS, ADULT SELF-CARE FOR (ENGLISH)      24 Hour Appointment Hotline       To make an appointment at any [facility] clinic, call [phone] ([phone]). If you don't have a family doctor or clinic, we will help you find one. [facility] clinics are conveniently located to serve the needs of you and your family.             Review of your medicines      START taking        Dose / Directions Last dose taken    levofloxacin 500 MG tablet   Commonly known as:  LEVAQUIN   Dose:  500 mg   Quantity:  7 tablet        Take 1 tablet (500 mg) by mouth daily for 7 days   Refills:  0          Our records show that you are taking the medicines listed below. If these are incorrect, please call your family doctor or clinic.        Dose / Directions Last dose taken    DOXYCYCLINE HYCLATE PO   Dose:  100 mg        Take 100 mg by mouth 2 times daily   Refills:  0        fluticasone 50 MCG/ACT spray   Commonly known as:  FLONASE   Dose:  1-2 spray   Quantity:  1 Bottle        Spray 1-2 sprays into both nostrils daily as needed for rhinitis or allergies   Refills:  0        MULTIVITAMINS PO   Dose:  1 tablet        Take 1 tablet by mouth daily   Refills:  0                 Prescriptions were sent or printed at these locations (1 Prescription)                   [facility], [place] - [address]   [address], [place] [address]    Telephone:  [phone]   Fax:  [phone]   Hours:                  E-Prescribed (1 of 1)         levofloxacin (LEVAQUIN) 500 MG tablet                Procedures and tests performed during your visit     Beta strep group A r/o culture    Rapid strep group A screen POCT      Orders Needing Specimen Collection     None      Pending Results     No orders found from 8/11/2017 to 8/14/2017.            Pending Culture Results     No orders found from 8/11/2017 to 8/14/2017.            Pending Results Instructions     If you had any lab results that were not finalized at the time of your Discharge, you can call the ED Lab Result RN at [phone]. You will be contacted by this team for any positive Lab results or changes in treatment. The nurses are available 7 days a week from 10A to 6:30P.  You can leave a message 24 hours per day and they will return your call.        Test Results From Your Hospital Stay        8/13/2017 12:27 PM      Component Results     Component Value Ref Range & Units Status    Rapid Strep A Screen negative neg Final    Internal QC OK Yes  Final                Thank you for choosing [facility]       Thank you for choosing [facility] for your care. Our goal is always to provide you with excellent care. Hearing back from our patients is one way we can continue to improve our services. Please take a few minutes to complete the written survey that you may receive in the mail after you visit with us. Thank you!        [employer] Information     [employer]iously gives you secure access to your electronic health record. If you see a primary care provider, you can also send messages to your care team and make appointments. If you have questions, please call your primary care clinic.  If you do not have a primary care provider,  please call [phone] and they will assist you.        Care EveryWhere ID     This is your Care EveryWhere ID. This could be used by other organizations to access your [place] medical records  [id number]        Equal Access to Services     NILDA TAVAREZ : Rupal Rodriguez, wabelen briscoe, qahoraceta kaalmajeferson chun, suzette craig. So [facility] [phone].    ATENCIÓN: Si habla español, tiene a santana disposición servicios gratuitos de asistencia lingüística. Llame al [phone].    We comply with applicable federal civil rights laws and [place] laws. We do not discriminate on the basis of race, color, national origin, age, disability sex, sexual orientation or gender identity.            After Visit Summary       This is your record. Keep this with you and show to your community pharmacist(s) and doctor(s) at your next visit.

## 2017-08-15 LAB
BACTERIA SPEC CULT: NORMAL
SPECIMEN SOURCE: NORMAL

## 2017-08-22 ENCOUNTER — MYC MEDICAL ADVICE (OUTPATIENT)
Dept: FAMILY MEDICINE | Facility: CLINIC | Age: 57
End: 2017-08-22

## 2018-03-19 ENCOUNTER — HOSPITAL ENCOUNTER (OUTPATIENT)
Dept: MAMMOGRAPHY | Facility: CLINIC | Age: 58
Discharge: HOME | End: 2018-03-19
Attending: FAMILY MEDICINE | Admitting: FAMILY MEDICINE
Payer: COMMERCIAL

## 2018-03-19 DIAGNOSIS — Z12.31 VISIT FOR SCREENING MAMMOGRAM: ICD-10-CM

## 2018-03-19 PROCEDURE — 77067 SCR MAMMO BI INCL CAD: CPT

## 2018-04-30 ENCOUNTER — HEALTH MAINTENANCE LETTER (OUTPATIENT)
Age: 58
End: 2018-04-30

## 2019-01-29 ENCOUNTER — OFFICE VISIT (OUTPATIENT)
Dept: FAMILY MEDICINE | Facility: CLINIC | Age: 59
End: 2019-01-29
Payer: COMMERCIAL

## 2019-01-29 VITALS
SYSTOLIC BLOOD PRESSURE: 124 MMHG | WEIGHT: 191 LBS | BODY MASS INDEX: 32.61 KG/M2 | RESPIRATION RATE: 16 BRPM | HEIGHT: 64 IN | HEART RATE: 96 BPM | DIASTOLIC BLOOD PRESSURE: 68 MMHG | OXYGEN SATURATION: 98 % | TEMPERATURE: 97 F

## 2019-01-29 DIAGNOSIS — J01.00 ACUTE MAXILLARY SINUSITIS, RECURRENCE NOT SPECIFIED: Primary | ICD-10-CM

## 2019-01-29 PROCEDURE — 99213 OFFICE O/P EST LOW 20 MIN: CPT | Performed by: FAMILY MEDICINE

## 2019-01-29 RX ORDER — DOXYCYCLINE 100 MG/1
100 CAPSULE ORAL 2 TIMES DAILY
Qty: 20 CAPSULE | Refills: 0 | Status: SHIPPED | OUTPATIENT
Start: 2019-01-29 | End: 2019-02-08

## 2019-01-29 ASSESSMENT — MIFFLIN-ST. JEOR: SCORE: 1428.43

## 2019-01-29 NOTE — PROGRESS NOTES
"  SUBJECTIVE:   Ivania Antonio is a 58 year old female who presents to clinic today for the following health issues:      Acute Illness   Acute illness concerns: head cold  Onset: 30 days     Fever: no (in the beginning first 4 days in 99s)    Chills/Sweats: no - in the beginning    Headache (location?): YES    Sinus Pressure:YES- little bit     Conjunctivitis:  no    Ear Pain: YES- first two weeks- left    Rhinorrhea: YES- runny and stuffy    Congestion: YES    Sore Throat: YES- just from coughing     Cough: YES    Wheeze: YES with activity    Decreased Appetite: YES    Nausea: no     Vomiting: no     Diarrhea:  no     Dysuria/Freq.: no     Fatigue/Achiness: YES - both    Sick/Strep Exposure: no     Therapies Tried and outcome: nyquil- not working and daytime cold and sinus.    Has never needed inhalers with illnesses.      Problem list and histories reviewed & adjusted, as indicated.  Additional history: as documented    BP Readings from Last 3 Encounters:   01/29/19 124/68   08/13/17 (!) 155/99   04/17/17 122/84    Wt Readings from Last 3 Encounters:   01/29/19 86.6 kg (191 lb)   08/13/17 78.9 kg (174 lb)   03/28/17 77.7 kg (171 lb 6.4 oz)                    Reviewed and updated as needed this visit by clinical staff       Reviewed and updated as needed this visit by Provider         ROS:  Constitutional, HEENT, cardiovascular, pulmonary, gi and gu systems are negative, except as otherwise noted.    OBJECTIVE:     /68   Pulse 96   Temp 97  F (36.1  C) (Tympanic)   Resp 16   Ht 1.613 m (5' 3.5\")   Wt 86.6 kg (191 lb)   LMP 10/01/2012   SpO2 98%   BMI 33.30 kg/m    Body mass index is 33.3 kg/m .  GENERAL: healthy, alert and no distress  HENT: ear canals and TM's normal, nose and mouth without ulcers or lesions  NECK: no adenopathy, no asymmetry, masses, or scars and thyroid normal to palpation  RESP: lungs clear to auscultation - no rales, rhonchi or wheezes  CV: regular rate and rhythm, normal S1 " S2, no S3 or S4, no murmur, click or rub, no peripheral edema and peripheral pulses strong  MS: no gross musculoskeletal defects noted, no edema    Diagnostic Test Results:  none     ASSESSMENT/PLAN:       Ivania was seen today for uri.    Diagnoses and all orders for this visit:    Acute maxillary sinusitis, recurrence not specified: discussed symptomatic treatment and antibiotic treatment  --discussed risks, benefits, and side effects of this new medication. Patient understands and is in agreement.    -sunscreen  -     doxycycline hyclate (VIBRAMYCIN) 100 MG capsule; Take 1 capsule (100 mg) by mouth 2 times daily for 10 days        Patient Instructions     Patient Education     Sinusitis (Antibiotic Treatment)    The sinuses are air-filled spaces within the bones of the face. They connect to the inside of the nose. Sinusitis is an inflammation of the tissue that lines the sinuses. Sinusitis can occur during a cold. It can also happen due to allergies to pollens and other particles in the air. Sinusitis can cause symptoms of sinus congestion and a feeling of fullness. A sinus infection causes fever, headache, and facial pain. There is often green or yellow fluid draining from the nose or into the back of the throat (post-nasal drip). You have been given antibiotics to treat this condition.  Home care    Take the full course of antibiotics as instructed. Do not stop taking them, even when you feel better.    Drink plenty of water, hot tea, and other liquids. This may help thin nasal mucus. It also may help your sinuses drain fluids.    Heat may help soothe painful areas of your face. Use a towel soaked in hot water. Or,  the shower and direct the warm spray onto your face. Using a vaporizer along with a menthol rub at night may also help soothe symptoms.     An expectorant with guaifenesin may help thin nasal mucus and help your sinuses drain fluids.    You can use an over-the-counter decongestant, unless a  similar medicine was prescribed to you. Nasal sprays decongestant (use this before the flight Afrin) work the fastest. Use one that contains phenylephrine or oxymetazoline. First blow your nose gently. Then use the spray. Do not use these medicines more often than directed on the label. If you do, your symptoms may get worse. You may also take pills that contain pseudoephedrine. Don t use products that combine multiple medicines. This is because side effects may be increased. Read labels. You can also ask the pharmacist for help. (People with high blood pressure should not use decongestants. They can raise blood pressure.)    Over-the-counter antihistamines may help if allergies contributed to your sinusitis.       Nasal rinses or irrigation during an acute sinus infection, unless your healthcare provider tells you to.     Use acetaminophen or ibuprofen to control pain, unless another pain medicine was prescribed to you. If you have chronic liver or kidney disease or ever had a stomach ulcer, talk with your healthcare provider before using these medicines. (Aspirin should never be taken by anyone under age 18 who is ill with a fever. It may cause severe liver damage.)    Don't smoke. This can make symptoms worse.  Follow-up care  Follow up with your healthcare provider or our staff if you are better in 1 week.  When to seek medical advice  Call your healthcare provider if any of these occur:    Facial pain or headache that gets worse    Stiff neck    Unusual drowsiness or confusion    Swelling of your forehead or eyelids    Vision problems, such as blurred or double vision    Fever of 100.4 F (38 C) or higher, or as directed by your healthcare provider    Seizure    Breathing problems    Symptoms don't go away in 10 days  Prevention  Here are steps you can take to help prevent an infection:    Keep good hand washing habits.    Don t have close contact with people who have sore throats, colds, or other upper  respiratory infections.    Don t smoke, and stay away from secondhand smoke.    Stay up to date with of your vaccines.  Date Last Reviewed: 11/1/2017 2000-2018 [employer]. [address] [place] [address], [place] [address]. All rights reserved. This information is not intended as a substitute for professional medical care. Always follow your healthcare professional's instructions.               Ramiro Owen MD  [facility]

## 2019-01-29 NOTE — PATIENT INSTRUCTIONS
Patient Education     Sinusitis (Antibiotic Treatment)    The sinuses are air-filled spaces within the bones of the face. They connect to the inside of the nose. Sinusitis is an inflammation of the tissue that lines the sinuses. Sinusitis can occur during a cold. It can also happen due to allergies to pollens and other particles in the air. Sinusitis can cause symptoms of sinus congestion and a feeling of fullness. A sinus infection causes fever, headache, and facial pain. There is often green or yellow fluid draining from the nose or into the back of the throat (post-nasal drip). You have been given antibiotics to treat this condition.  Home care    Take the full course of antibiotics as instructed. Do not stop taking them, even when you feel better.    Drink plenty of water, hot tea, and other liquids. This may help thin nasal mucus. It also may help your sinuses drain fluids.    Heat may help soothe painful areas of your face. Use a towel soaked in hot water. Or,  the shower and direct the warm spray onto your face. Using a vaporizer along with a menthol rub at night may also help soothe symptoms.     An expectorant with guaifenesin may help thin nasal mucus and help your sinuses drain fluids.    You can use an over-the-counter decongestant, unless a similar medicine was prescribed to you. Nasal sprays decongestant (use this before the flight Afrin) work the fastest. Use one that contains phenylephrine or oxymetazoline. First blow your nose gently. Then use the spray. Do not use these medicines more often than directed on the label. If you do, your symptoms may get worse. You may also take pills that contain pseudoephedrine. Don t use products that combine multiple medicines. This is because side effects may be increased. Read labels. You can also ask the pharmacist for help. (People with high blood pressure should not use decongestants. They can raise blood pressure.)     Over-the-counter antihistamines may help if allergies contributed to your sinusitis.       Nasal rinses or irrigation during an acute sinus infection, unless your healthcare provider tells you to.     Use acetaminophen or ibuprofen to control pain, unless another pain medicine was prescribed to you. If you have chronic liver or kidney disease or ever had a stomach ulcer, talk with your healthcare provider before using these medicines. (Aspirin should never be taken by anyone under age 18 who is ill with a fever. It may cause severe liver damage.)    Don't smoke. This can make symptoms worse.  Follow-up care  Follow up with your healthcare provider or our staff if you are better in 1 week.  When to seek medical advice  Call your healthcare provider if any of these occur:    Facial pain or headache that gets worse    Stiff neck    Unusual drowsiness or confusion    Swelling of your forehead or eyelids    Vision problems, such as blurred or double vision    Fever of 100.4 F (38 C) or higher, or as directed by your healthcare provider    Seizure    Breathing problems    Symptoms don't go away in 10 days  Prevention  Here are steps you can take to help prevent an infection:    Keep good hand washing habits.    Don t have close contact with people who have sore throats, colds, or other upper respiratory infections.    Don t smoke, and stay away from secondhand smoke.    Stay up to date with of your vaccines.  Date Last Reviewed: 11/1/2017 2000-2018 [employer]. [address] [place] [address], [place] [address]. All rights reserved. This information is not intended as a substitute for professional medical care. Always follow your healthcare professional's instructions.

## 2019-03-21 ENCOUNTER — HOSPITAL ENCOUNTER (OUTPATIENT)
Dept: MAMMOGRAPHY | Facility: CLINIC | Age: 59
Discharge: HOME | End: 2019-03-21
Attending: FAMILY MEDICINE | Admitting: FAMILY MEDICINE
Payer: COMMERCIAL

## 2019-03-21 DIAGNOSIS — Z12.31 VISIT FOR SCREENING MAMMOGRAM: ICD-10-CM

## 2019-03-21 PROCEDURE — 77063 BREAST TOMOSYNTHESIS BI: CPT

## 2020-03-04 ENCOUNTER — TELEPHONE (OUTPATIENT)
Dept: FAMILY MEDICINE | Facility: CLINIC | Age: 60
End: 2020-03-04

## 2020-03-04 NOTE — TELEPHONE ENCOUNTER
Panel Management Review      Patient has the following on her problem list: None      Composite cancer screening  Chart review shows that this patient is due/due soon for the following Pap Smear, Colonoscopy and Fecal Colorectal (FIT)  Summary:    Patient is due/failing the following:   COLONOSCOPY, PAP and PHYSICAL    Action needed:   Patient needs office visit for a physical with a pap smear and patient needs to complete a colonoscopy.    Type of outreach:    Sent letter.    Questions for provider review:    None                                                                                                                                    Sury Rosario MA

## 2020-03-04 NOTE — LETTER
[facility]  [address] [address]  Phone: [phone]  March 4, 2020      Ivania Antonio  [address] [place] [address]      Dear Ivania,    We care about your health and have reviewed your health plan including your medical conditions, medications, and lab results.  Based on this review, it is recommended that you follow up regarding the following health topic(s):  -Colon Cancer Screening  -Cervical Cancer Screening  -Wellness (Physical) Visit     We recommend you take the following action(s):  -schedule a WELLNESS (Physical) APPOINTMENT.  We will perform the following labs: Lipids (fasting cholesterol - nothing to eat except water and/or meds for 8-10 hours).  -schedule a MAMMOGRAM which is due. Please disregard this reminder if you have had this exam elsewhere within the last 1-2 years please let us know so we can update your records.  -schedule a COLONOSCOPY to look for colon cancer (due every 10 years or 5 years in higher risk situations.)  Colonoscopies can prevent 90-95% of colon cancer deaths.  Problem lesions can be removed before they ever become cancer.  If you do not wish to do a colonoscopy or cannot afford to do one at this time, there is another option called a Fecal Immunochemical Occult Blood Test (FIT) a take home stool sample kit.  It does not replace the colonoscopy for colorectal cancer screening, but it can detect hidden bleeding in the lower colon.  It does need to be repeated every year and if a positive result is obtained, you would be referred for a colonoscopy.  If you have completed either one of these tests at another facility, please have the records sent to our clinic for our records.  -schedule a PAP SMEAR EXAM which is due.  Please disregard this reminder if you have had this exam elsewhere within the last year.  It would be helpful for us to have a copy of your recent pap smear report to update your records.     Please  call us at the [facility] [phone] (or use Sp[facility]) to address the above recommendations.     Thank you for trusting [facility] and we appreciate the opportunity to serve you.  We look forward to supporting your healthcare needs in the future.    Healthy Regards,    Your Health Care Team  [facility]

## 2020-04-20 ENCOUNTER — TELEPHONE (OUTPATIENT)
Dept: FAMILY MEDICINE | Facility: CLINIC | Age: 60
End: 2020-04-20

## 2020-04-20 ENCOUNTER — OFFICE VISIT (OUTPATIENT)
Dept: FAMILY MEDICINE | Facility: CLINIC | Age: 60
End: 2020-04-20
Payer: COMMERCIAL

## 2020-04-20 VITALS
OXYGEN SATURATION: 91 % | TEMPERATURE: 98 F | BODY MASS INDEX: 35.12 KG/M2 | WEIGHT: 201.4 LBS | DIASTOLIC BLOOD PRESSURE: 88 MMHG | HEART RATE: 97 BPM | SYSTOLIC BLOOD PRESSURE: 124 MMHG

## 2020-04-20 DIAGNOSIS — N63.0 LUMP OR MASS IN BREAST: Primary | ICD-10-CM

## 2020-04-20 PROCEDURE — 99214 OFFICE O/P EST MOD 30 MIN: CPT | Performed by: NURSE PRACTITIONER

## 2020-04-20 NOTE — TELEPHONE ENCOUNTER
Reason for call:  Patient reporting a symptom    Symptom or request: Lump in right breast above nipple    Duration (how long have symptoms been present): 4/13/2020    Have you been treated for this before? No    Additional comments: Benign lump and lumpectomy in left breast 10 years ago, in 2010 approximately.    Phone Number patient can be reached at:  Home number on file [phone] (home)    Best Time:  Any    Can we leave a detailed message on this number:  YES    Call taken on 4/20/2020 at 8:12 AM by Alyssa Hunt

## 2020-04-20 NOTE — PROGRESS NOTES
Subjective     Ivania Antonio is a 59 year old female who presents to clinic today for the following health issues:    HPI   Chief Complaint   Patient presents with     Breast Mass     1 week ago, right, no pain, no change,no redness,       Reviewed and updated as needed this visit by Provider  Allergies         Review of Systems   ROS COMP: Constitutional, HEENT, cardiovascular, pulmonary, gi and gu systems are negative, except as otherwise noted.      Objective    /88 (BP Location: Left arm, Patient Position: Left side, Cuff Size: Adult Regular)   Pulse 97   Temp 98  F (36.7  C) (Temporal)   Wt 91.4 kg (201 lb 6.4 oz)   LMP 10/01/2012   SpO2 91%   BMI 35.12 kg/m    Body mass index is 35.12 kg/m .  Physical Exam   GENERAL: healthy, alert and no distress  BREAST: no palpable axillary masses or adenopathy and small lump less than 1 cm, superficial, non-tender the top of the right breast by nipple   SKIN: no suspicious lesions or rashes  PSYCH: mentation appears normal, affect normal/bright    Diagnostic Test Results:  Labs reviewed in Epic        Assessment & Plan     1. Lump or mass in breast    - US Breast Right Limited 1-3 Quadrants; Future  - MA Diagnostic Bilateral w/Eliu; Future       See Patient Instructions      ISAAC Lao [facility]

## 2020-04-20 NOTE — TELEPHONE ENCOUNTER
S-(situation): Right breast lump.  First noticed a week ago.    It is pea-sized.  Hard.  Not painful.    B-(background): Last seen Jan 2019. Previously has seen Lisa.   Last mammogram 3/21/19; normal appearing.  Pt is due for appt but it was delayed due to COVID19.    A-(assessment): new right breast lump.    R-(recommendations): Pt needs face to face visit.

## 2020-04-27 ENCOUNTER — HOSPITAL ENCOUNTER (OUTPATIENT)
Dept: ULTRASOUND IMAGING | Facility: CLINIC | Age: 60
End: 2020-04-27
Attending: NURSE PRACTITIONER
Payer: COMMERCIAL

## 2020-04-27 ENCOUNTER — HOSPITAL ENCOUNTER (OUTPATIENT)
Dept: MAMMOGRAPHY | Facility: CLINIC | Age: 60
End: 2020-04-27
Attending: NURSE PRACTITIONER
Payer: COMMERCIAL

## 2020-04-27 DIAGNOSIS — N63.0 LUMP OR MASS IN BREAST: ICD-10-CM

## 2020-04-27 DIAGNOSIS — N63.15 BREAST LUMP ON RIGHT SIDE AT 12 O'CLOCK POSITION: ICD-10-CM

## 2020-04-27 PROCEDURE — 76642 ULTRASOUND BREAST LIMITED: CPT | Mod: RT

## 2020-04-27 PROCEDURE — 77066 DX MAMMO INCL CAD BI: CPT

## 2020-10-29 ENCOUNTER — TELEPHONE (OUTPATIENT)
Dept: FAMILY MEDICINE | Facility: CLINIC | Age: 60
End: 2020-10-29

## 2020-10-29 NOTE — LETTER
[facility]  [address] [address]  [phone]        October 29, 2020  Ivania Antonio  [address] [place] [address]    Dear Ivania,    I care about your health and have reviewed your health plan. I have reviewed your medical conditions, medication list, and lab results and am making recommendations based on this review, to better manage your health.    You are in particular need of attention regarding:  -Colon Cancer Screening  -Cervical Cancer Screening    I am recommending that you:  -schedule a COLONOSCOPY to look for colon cancer (due every 10 years or 5 years in higher risk situations.)   Colon cancer is now the second leading cause of death in the [place] for both men and women and there are over 130,000 new cases and 50,000 deaths per year from colon cancer.  Colonoscopies can prevent 90-95% of these deaths.  Problem lesions can be removed before they ever become cancer.  This test is not only looking for cancer, but also getting rid of precancerious lesions.  If you do not wish to do a colonoscopy or cannot afford to do one, at this time, there is another option. It is called a FIT test or Fecal Immunochemical Occult Blood Test (take home stool sample kit).  It does not replace the colonoscopy for colorectal cancer screening, but it can detect hidden bleeding in the lower colon.  It does need to be repeated every year and if a positive result is obtained, you would be referred for a colonoscopy.  If you have completed either one of these tests at another facility, please have the records sent to our clinic so that we can best coordinate your care.  -schedule a PAP SMEAR EXAM which is due.  Please disregard this reminder if you have had this exam elsewhere within the last year.  It would be helpful for us to have a copy of your recent pap smear report in our file so that we can best coordinate your care.    Here is a list of  Health Maintenance topics that are due now or due soon:  Health Maintenance Due   Topic Date Due     ADVANCE CARE PLANNING  1960     HIV SCREENING  04/28/1976     ZOSTER IMMUNIZATION (1 of 2) 04/28/2011     PREVENTIVE CARE VISIT  03/28/2018     HPV TEST  03/28/2018     PAP  03/28/2018     COLORECTAL CANCER SCREENING  03/28/2018     PHQ-2  01/01/2020     LIPID  03/25/2020     INFLUENZA VACCINE (1) 09/01/2020       Please call us at [phone] (or use 2U) to address the above recommendations.     Thank you for trusting [facility] and we appreciate the opportunity to serve you.  We look forward to supporting your healthcare needs in the future.    Healthy Regards,    Deisi Randle/ ELIZABETH

## 2020-10-29 NOTE — TELEPHONE ENCOUNTER
Panel Management Review      Patient has the following on her problem list: None      Composite cancer screening  Chart review shows that this patient is due/due soon for the following Pap Smear, Colonoscopy and Fecal Colorectal (FIT)  Summary:    Patient is due/failing the following:   COLONOSCOPY, FIT and PAP    Action needed:   Patient needs office visit for PE with pap. Needs to schedule colonoscopy or due a fit test     Type of outreach:    Sent letter.    Questions for provider review:    None                                                                                                                                    Mary Calloway, CMA

## 2020-12-20 ENCOUNTER — HEALTH MAINTENANCE LETTER (OUTPATIENT)
Age: 60
End: 2020-12-20

## 2021-02-09 ASSESSMENT — ENCOUNTER SYMPTOMS
HEMATOCHEZIA: 0
DIZZINESS: 0
PALPITATIONS: 0
CHILLS: 0
NERVOUS/ANXIOUS: 0
SHORTNESS OF BREATH: 0
WEAKNESS: 0
COUGH: 0
CONSTIPATION: 0
BREAST MASS: 0
DYSURIA: 0
EYE PAIN: 0
MYALGIAS: 0
DIARRHEA: 0
FREQUENCY: 0
PARESTHESIAS: 0
FEVER: 0
HEARTBURN: 0
ABDOMINAL PAIN: 0
SORE THROAT: 0
ARTHRALGIAS: 0
NAUSEA: 0
HEMATURIA: 0
JOINT SWELLING: 0
HEADACHES: 0

## 2021-02-12 ENCOUNTER — OFFICE VISIT (OUTPATIENT)
Dept: FAMILY MEDICINE | Facility: CLINIC | Age: 61
End: 2021-02-12
Payer: COMMERCIAL

## 2021-02-12 VITALS
TEMPERATURE: 96 F | BODY MASS INDEX: 34.31 KG/M2 | DIASTOLIC BLOOD PRESSURE: 70 MMHG | HEART RATE: 114 BPM | SYSTOLIC BLOOD PRESSURE: 132 MMHG | HEIGHT: 64 IN | RESPIRATION RATE: 16 BRPM | OXYGEN SATURATION: 96 % | WEIGHT: 201 LBS

## 2021-02-12 DIAGNOSIS — Z12.11 COLON CANCER SCREENING: ICD-10-CM

## 2021-02-12 DIAGNOSIS — Z00.00 ROUTINE GENERAL MEDICAL EXAMINATION AT A HEALTH CARE FACILITY: Primary | ICD-10-CM

## 2021-02-12 DIAGNOSIS — Z12.4 SCREENING FOR CERVICAL CANCER: ICD-10-CM

## 2021-02-12 DIAGNOSIS — R87.610 ASCUS WITH POSITIVE HIGH RISK HPV CERVICAL: ICD-10-CM

## 2021-02-12 DIAGNOSIS — R87.810 ASCUS WITH POSITIVE HIGH RISK HPV CERVICAL: ICD-10-CM

## 2021-02-12 LAB
CHOLEST SERPL-MCNC: 184 MG/DL
GLUCOSE SERPL-MCNC: 104 MG/DL (ref 70–99)
HDLC SERPL-MCNC: 61 MG/DL
LDLC SERPL CALC-MCNC: 97 MG/DL
NONHDLC SERPL-MCNC: 123 MG/DL
TRIGL SERPL-MCNC: 132 MG/DL

## 2021-02-12 PROCEDURE — G0145 SCR C/V CYTO,THINLAYER,RESCR: HCPCS | Performed by: FAMILY MEDICINE

## 2021-02-12 PROCEDURE — 99396 PREV VISIT EST AGE 40-64: CPT | Performed by: FAMILY MEDICINE

## 2021-02-12 PROCEDURE — 87624 HPV HI-RISK TYP POOLED RSLT: CPT | Performed by: FAMILY MEDICINE

## 2021-02-12 PROCEDURE — 80061 LIPID PANEL: CPT | Performed by: FAMILY MEDICINE

## 2021-02-12 PROCEDURE — 36415 COLL VENOUS BLD VENIPUNCTURE: CPT | Performed by: FAMILY MEDICINE

## 2021-02-12 PROCEDURE — 82947 ASSAY GLUCOSE BLOOD QUANT: CPT | Performed by: FAMILY MEDICINE

## 2021-02-12 ASSESSMENT — ENCOUNTER SYMPTOMS
DIARRHEA: 0
CHILLS: 0
PARESTHESIAS: 0
DIZZINESS: 0
WEAKNESS: 0
ABDOMINAL PAIN: 0
NAUSEA: 0
ARTHRALGIAS: 0
DYSURIA: 0
NERVOUS/ANXIOUS: 0
HEADACHES: 0
FEVER: 0
EYE PAIN: 0
PALPITATIONS: 0
HEARTBURN: 0
SORE THROAT: 0
SHORTNESS OF BREATH: 0
BREAST MASS: 0
CONSTIPATION: 0
HEMATURIA: 0
FREQUENCY: 0
MYALGIAS: 0
COUGH: 0
JOINT SWELLING: 0
HEMATOCHEZIA: 0

## 2021-02-12 ASSESSMENT — MIFFLIN-ST. JEOR: SCORE: 1467.76

## 2021-02-12 NOTE — PROGRESS NOTES
SUBJECTIVE:   CC: Ivania Antnoio is an 60 year old woman who presents for preventive health visit.     Patient has been advised of split billing requirements and indicates understanding: Yes  Healthy Habits:     Getting at least 3 servings of Calcium per day:  Yes    Bi-annual eye exam:  Yes    Dental care twice a year:  Yes    Sleep apnea or symptoms of sleep apnea:  Excessive snoring    Diet:  Regular (no restrictions)    Frequency of exercise:  2-3 days/week    Duration of exercise:  30-45 minutes    Taking medications regularly:  Yes    Medication side effects:  Not applicable    PHQ-2 Total Score: 0    Additional concerns today:  No        Today's PHQ-2 Score:   PHQ-2 ( 1999 Pfizer) 2/9/2021   Q1: Little interest or pleasure in doing things 0   Q2: Feeling down, depressed or hopeless 0   PHQ-2 Score 0   Q1: Little interest or pleasure in doing things Not at all   Q2: Feeling down, depressed or hopeless Not at all   PHQ-2 Score 0       Abuse: Current or Past (Physical, Sexual or Emotional) - No  Do you feel safe in your environment? Yes    Have you ever done Advance Care Planning? (For example, a Health Directive, POLST, or a discussion with a medical provider or your loved ones about your wishes): No, advance care planning information given to patient to review.  Patient plans to discuss their wishes with loved ones or provider.      Social History     Tobacco Use     Smoking status: Never Smoker     Smokeless tobacco: Never Used   Substance Use Topics     Alcohol use: Yes     Comment: ocass. 4 beers a month     If you drink alcohol do you typically have >3 drinks per day or >7 drinks per week? No    Alcohol Use 2/9/2021   Prescreen: >3 drinks/day or >7 drinks/week? No   Prescreen: >3 drinks/day or >7 drinks/week? -   No flowsheet data found.      Reviewed orders with patient.  Reviewed health maintenance and updated orders accordingly - Yes  BP Readings from Last 3 Encounters:   02/12/21 132/70   04/20/20  124/88   01/29/19 124/68    Wt Readings from Last 3 Encounters:   02/12/21 91.2 kg (201 lb)   04/20/20 91.4 kg (201 lb 6.4 oz)   01/29/19 86.6 kg (191 lb)                    Breast CA Risk Screening:  Breast CA Risk Assessment (FHS-7) 2/9/2021   Do you have a family history of breast, colon, or ovarian cancer? No / Unknown         Mammogram Screening: Recommended mammography every 1-2 years with patient discussion and risk factor consideration  Pertinent mammograms are reviewed under the imaging tab.    History of abnormal Pap smear:   Last 3 Pap and HPV Results:   PAP / HPV Latest Ref Rng & Units 3/28/2017 3/25/2015   PAP - NIL ASC-US(A)   HPV 16 DNA NEG Negative Negative   HPV 18 DNA NEG Negative Negative   OTHER HR HPV NEG Negative Positive(A)     PAP / HPV Latest Ref Rng & Units 3/28/2017 3/25/2015   PAP - NIL ASC-US(A)   HPV 16 DNA NEG Negative Negative   HPV 18 DNA NEG Negative Negative   OTHER HR HPV NEG Negative Positive(A)     Reviewed and updated as needed this visit by clinical staff                 Reviewed and updated as needed this visit by Provider                    Review of Systems   Constitutional: Negative for chills and fever.   HENT: Negative for congestion, ear pain, hearing loss and sore throat.    Eyes: Negative for pain and visual disturbance.   Respiratory: Negative for cough and shortness of breath.    Cardiovascular: Negative for chest pain, palpitations and peripheral edema.   Gastrointestinal: Negative for abdominal pain, constipation, diarrhea, heartburn, hematochezia and nausea.   Breasts:  Negative for tenderness, breast mass and discharge.   Genitourinary: Negative for dysuria, frequency, genital sores, hematuria, pelvic pain, urgency, vaginal bleeding and vaginal discharge.   Musculoskeletal: Negative for arthralgias, joint swelling and myalgias.   Skin: Negative for rash.   Neurological: Negative for dizziness, weakness, headaches and paresthesias.   Psychiatric/Behavioral:  "Negative for mood changes. The patient is not nervous/anxious.           OBJECTIVE:   /70   Pulse 114   Temp 96  F (35.6  C) (Tympanic)   Resp 16   Ht 1.619 m (5' 3.75\")   Wt 91.2 kg (201 lb)   LMP 10/01/2012   SpO2 96%   BMI 34.77 kg/m    Physical Exam  GENERAL APPEARANCE: healthy, alert and no distress  EYES: Eyes grossly normal to inspection, PERRL and conjunctivae and sclerae normal  HENT: ear canals and TM's normal, nose and mouth without ulcers or lesions,  NECK: no adenopathy, no asymmetry, masses, or scars and thyroid normal to palpation  RESP: lungs clear to auscultation - no rales, rhonchi or wheezes  BREAST: normal without masses, tenderness or nipple discharge and no palpable axillary masses or adenopathy  CV: regular rate and rhythm, normal S1 S2, no S3 or S4, no murmur, click or rub, no peripheral edema and peripheral pulses strong  ABDOMEN: soft, nontender, no hepatosplenomegaly, no masses and bowel sounds normal   (female): normal female external genitalia, normal urethral meatus, vaginal mucosal atrophy noted, normal cervix without masses or abnormal discharge  MS: no musculoskeletal defects are noted and gait is age appropriate without ataxia  SKIN: no suspicious lesions or rashes  NEURO: Normal strength and tone, sensory exam grossly normal, mentation intact and speech normal  PSYCH: mentation appears normal and affect normal/bright    Diagnostic Test Results:  Labs reviewed in Epic    ASSESSMENT/PLAN:   Ivania was seen today for physical.    Diagnoses and all orders for this visit:    Routine general medical examination at a health care facility  -     GLUCOSE  -     Lipid panel reflex to direct LDL Fasting    ASCUS with positive high risk HPV cervical  -     Pap imaged thin layer screen with HPV - recommended age 30 - 65  -     HPV High Risk Types DNA Cervical    Screening for cervical cancer  -     Pap imaged thin layer screen with HPV - recommended age 30 - 65  -     HPV High " "Risk Types DNA Cervical    Colon cancer screening  -     Fecal colorectal cancer screen FIT; Future        Patient has been advised of split billing requirements and indicates understanding: Yes  COUNSELING:  Reviewed preventive health counseling, as reflected in patient instructions       Regular exercise       Healthy diet/nutrition       Vision screening       HIV screeninx in teen years, 1x in adult years, and at intervals if high risk       (Cris)menopause management    Estimated body mass index is 34.77 kg/m  as calculated from the following:    Height as of this encounter: 1.619 m (5' 3.75\").    Weight as of this encounter: 91.2 kg (201 lb).  Wt Readings from Last 4 Encounters:   21 91.2 kg (201 lb)   20 91.4 kg (201 lb 6.4 oz)   19 86.6 kg (191 lb)   17 78.9 kg (174 lb)       Weight management plan: Discussed healthy diet and exercise guidelines    She reports that she has never smoked. She has never used smokeless tobacco.      Counseling Resources:  ATP IV Guidelines  Pooled Cohorts Equation Calculator  Breast Cancer Risk Calculator  BRCA-Related Cancer Risk Assessment: FHS-7 Tool  FRAX Risk Assessment  ICSI Preventive Guidelines  Dietary Guidelines for Americans,   USDA's MyPlate  ASA Prophylaxis  Lung CA Screening    Ramiro Owen MD  [facility]  "

## 2021-02-12 NOTE — PATIENT INSTRUCTIONS
Vaginal Dryness due to menopause:  -water based lubricants during sex  -Vaginal moisturizer Replense use it in and around the vagina once a day for one week and then few times a week after that  -vaginal estrogens like premarin    Preventive Health Recommendations  Female Ages 50 - 64    Yearly exam: See your health care provider every year in order to  o Review health changes.   o Discuss preventive care.    o Review your medicines if your doctor has prescribed any.      Get a Pap test every three years (unless you have an abnormal result and your provider advises testing more often).    If you get Pap tests with HPV test, you only need to test every 5 years, unless you have an abnormal result.     You do not need a Pap test if your uterus was removed (hysterectomy) and you have not had cancer.    You should be tested each year for STDs (sexually transmitted diseases) if you're at risk.     Have a mammogram every 1 to 2 years.    Have a colonoscopy at age 50, or have a yearly FIT test (stool test). These exams screen for colon cancer.      Have a cholesterol test every 5 years, or more often if advised.    Have a diabetes test (fasting glucose) every three years. If you are at risk for diabetes, you should have this test more often.     If you are at risk for osteoporosis (brittle bone disease), think about having a bone density scan (DEXA).    Shots: Get a flu shot each year. Get a tetanus shot every 10 years.    Nutrition:     Eat at least 5 servings of fruits and vegetables each day.    Eat whole-grain bread, whole-wheat pasta and brown rice instead of white grains and rice.    Get adequate Calcium and Vitamin D.     Lifestyle    Exercise at least 150 minutes a week (30 minutes a day, 5 days a week). This will help you control your weight and prevent disease.    Limit alcohol to one drink per day.    No smoking.     Wear sunscreen to prevent skin cancer.     See your dentist every six months for an exam and  cleaning.    See your eye doctor every 1 to 2 years.    Preventive Health Recommendations  Female Ages 50 - 64    Yearly exam: See your health care provider every year in order to  o Review health changes.   o Discuss preventive care.    o Review your medicines if your doctor has prescribed any.      Get a Pap test every three years (unless you have an abnormal result and your provider advises testing more often).    If you get Pap tests with HPV test, you only need to test every 5 years, unless you have an abnormal result.     You do not need a Pap test if your uterus was removed (hysterectomy) and you have not had cancer.    You should be tested each year for STDs (sexually transmitted diseases) if you're at risk.     Have a mammogram every 1 to 2 years.    Have a colonoscopy at age 50, or have a yearly FIT test (stool test). These exams screen for colon cancer.      Have a cholesterol test every 5 years, or more often if advised.    Have a diabetes test (fasting glucose) every three years. If you are at risk for diabetes, you should have this test more often.     If you are at risk for osteoporosis (brittle bone disease), think about having a bone density scan (DEXA).    Shots: Get a flu shot each year. Get a tetanus shot every 10 years.    Nutrition:     Eat at least 5 servings of fruits and vegetables each day.    Eat whole-grain bread, whole-wheat pasta and brown rice instead of white grains and rice.    Get adequate Calcium and Vitamin D.     Lifestyle    Exercise at least 150 minutes a week (30 minutes a day, 5 days a week). This will help you control your weight and prevent disease.    Limit alcohol to one drink per day.    No smoking.     Wear sunscreen to prevent skin cancer.     See your dentist every six months for an exam and cleaning.    See your eye doctor every 1 to 2 years.    Thank you for your questions about the COVID Vaccine!  It is very important that when your time comes that you should be  ready and get your vaccine.    Please visit our webpage for the most up to date information on vaccine availability.  [email or web address]    Why should I get a COVID-19 vaccine?  While we have made some progress in the fight against COVID-19 with public health measures like masking and social distancing, widespread vaccination is the only way that we can stop the pandemic. Not only does getting the vaccine protect you against COVID-19, it also reduces the chances that you will spread it to others, including your family and friends    Can I get COVID-19 from the vaccine? Does it use a live virus?  No, it is not possible to get COVID-19 from the vaccine. The Pfizer and Moderna vaccines use only genetic material from the virus while other vaccines being studied use inactivated versions of the virus. None of these can cause COVID-19.     Is it safe to receive a COVID-19 vaccine? How effective are they?  Two COVID-19 vaccines produced by Pfizer/TRIBAX and Moderna are both safe and more than 94 percent effective, according to the results of two large clinical trials. Both the Pfizer/BioNTech and Moderna vaccines have already received U.S. Food and Drug Administration (FDA) emergency use authorization, allowing them to be used in the [place].    How do the Pfizer/BioNTech and Moderna vaccines work and how many doses do I need?  The Pfizer/BioNTech and Moderna vaccines do not use live or weakened versions of the coronavirus causing COVID-19. Instead, these vaccines have genetic material called mRNA or  messenger RNA  that is taken from the virus. Both vaccines come in two doses. Once you receive both doses of the vaccine, it will likely take several weeks for your body to develop immunity.    What are the side effects?  During the clinical trials, only mild to moderate flu-like side effects which are part of the immune response were reported, including a headache, fatigue, chills,  fever, and muscle and joint soreness. Side effects are more likely to occur after the second dose. Most of these symptoms ended three days after the vaccine, or earlier.     If I had COVID-19 should I get the vaccine?  Yes. People who have tested positive for COVID-19 produce antibodies that offer some protection against the virus, but we don t know enough yet about antibody protection and how long it may last, so we recommend that everyone get the vaccine.      Do I have to continue wearing a mask after I get the vaccine?  Yes. Everyone should wear face masks, wash their hands frequently, practice social distancing, and take other safety steps until more people have received the vaccine, the number of COVID-19 cases nationwide is no longer at pandemic levels, and we understand more about how long these vaccines will protect us.

## 2021-02-16 LAB
COPATH REPORT: NORMAL
PAP: NORMAL

## 2021-02-18 LAB
FINAL DIAGNOSIS: ABNORMAL
HPV HR 12 DNA CVX QL NAA+PROBE: POSITIVE
HPV16 DNA SPEC QL NAA+PROBE: NEGATIVE
HPV18 DNA SPEC QL NAA+PROBE: NEGATIVE
SPECIMEN DESCRIPTION: ABNORMAL
SPECIMEN SOURCE CVX/VAG CYTO: ABNORMAL

## 2021-02-19 ENCOUNTER — PATIENT OUTREACH (OUTPATIENT)
Dept: FAMILY MEDICINE | Facility: CLINIC | Age: 61
End: 2021-02-19

## 2021-02-22 DIAGNOSIS — Z12.11 COLON CANCER SCREENING: ICD-10-CM

## 2021-02-22 PROCEDURE — 82274 ASSAY TEST FOR BLOOD FECAL: CPT | Performed by: FAMILY MEDICINE

## 2021-02-23 LAB — HEMOCCULT STL QL IA: NEGATIVE

## 2021-04-22 ENCOUNTER — HOSPITAL ENCOUNTER (OUTPATIENT)
Dept: MAMMOGRAPHY | Facility: CLINIC | Age: 61
Discharge: HOME | End: 2021-04-22
Attending: FAMILY MEDICINE | Admitting: FAMILY MEDICINE
Payer: COMMERCIAL

## 2021-04-22 DIAGNOSIS — Z12.31 VISIT FOR SCREENING MAMMOGRAM: ICD-10-CM

## 2021-04-22 PROCEDURE — 77063 BREAST TOMOSYNTHESIS BI: CPT

## 2021-08-08 ENCOUNTER — OFFICE VISIT (OUTPATIENT)
Dept: URGENT CARE | Facility: URGENT CARE | Age: 61
End: 2021-08-08
Payer: COMMERCIAL

## 2021-08-08 VITALS
BODY MASS INDEX: 34.89 KG/M2 | RESPIRATION RATE: 16 BRPM | TEMPERATURE: 96.9 F | HEART RATE: 86 BPM | WEIGHT: 204.4 LBS | OXYGEN SATURATION: 98 % | DIASTOLIC BLOOD PRESSURE: 70 MMHG | SYSTOLIC BLOOD PRESSURE: 130 MMHG | HEIGHT: 64 IN

## 2021-08-08 DIAGNOSIS — B02.9 HERPES ZOSTER WITHOUT COMPLICATION: Primary | ICD-10-CM

## 2021-08-08 PROCEDURE — 99213 OFFICE O/P EST LOW 20 MIN: CPT | Performed by: EMERGENCY MEDICINE

## 2021-08-08 RX ORDER — BETAMETHASONE DIPROPIONATE 0.5 MG/G
CREAM TOPICAL 2 TIMES DAILY
Qty: 15 G | Refills: 1 | Status: SHIPPED | OUTPATIENT
Start: 2021-08-08 | End: 2024-05-10

## 2021-08-08 RX ORDER — VALACYCLOVIR HYDROCHLORIDE 1 G/1
1000 TABLET, FILM COATED ORAL 3 TIMES DAILY
Qty: 21 TABLET | Refills: 0 | Status: SHIPPED | OUTPATIENT
Start: 2021-08-08 | End: 2022-03-30

## 2021-08-08 ASSESSMENT — MIFFLIN-ST. JEOR: SCORE: 1478.18

## 2021-08-08 NOTE — PATIENT INSTRUCTIONS
Take Valtrex pills until gone    Have  thoroughly rubbing cream twice daily for a week    Recheck if worsening redness, distribution of rash, or worsening pain    Contact your PCP in 7 days if no improvement.

## 2021-08-08 NOTE — PROGRESS NOTES
CHIEF COMPLAINT: Rash on back      HPI: Patient is a 61-year-old female who was noted a painful rash on her back of 4-day duration.   has looked at it feels it has gotten worse over the last 4 days.  She notes no similar rash elsewhere on her body.  She has no sense of pain along dermatomal distribution to either side.  No chronic dermatitides.  She has tried several over-the-counter creams with no improvement.  It is slightly painful to lean against the area or have her bra rub on the area.  Patient does not recall getting shingles shot and that her PCP in the past told her it was not needed because she had chickenpox as a child.      ROS: See HPI otherwise normal.    Allergies   Allergen Reactions     Penicillins Rash     LW Reaction: Rash, Generalized      Current Outpatient Medications   Medication Sig Dispense Refill     betamethasone dipropionate (DIPROSONE) 0.05 % external cream Apply topically 2 times daily 15 g 1     Multiple Vitamin (MULTIVITAMINS PO) Take 1 tablet by mouth daily        valACYclovir (VALTREX) 1000 mg tablet Take 1 tablet (1,000 mg) by mouth 3 times daily for 7 days 21 tablet 0         PE: Physical exam reveals a 61-year-old female be no acute distress.  Vital signs are normal as see chart.  Examination of her back reveals a localized cluster of vesicles essentially in the midline in the lower thoracic region with one small similar area of vesicle adjacent to it.  She has no similar vesicular rash lesions seen along the lateral chest bilaterally.        TREATMENT: None.      ASSESSMENT: Vesicular rash that appears to be herpes zoster in description without any additional dermatomal distribution.  No secondary infection.      DIAGNOSIS: Herpes zoster.      PLAN: Valtrex as instructed.  Topical betamethasone.  Recheck if worse rash, pain, or signs of infection.  Follow-up  With 1 week if pain persist.

## 2021-10-03 ENCOUNTER — OFFICE VISIT (OUTPATIENT)
Dept: URGENT CARE | Facility: URGENT CARE | Age: 61
End: 2021-10-03
Payer: COMMERCIAL

## 2021-10-03 ENCOUNTER — HEALTH MAINTENANCE LETTER (OUTPATIENT)
Age: 61
End: 2021-10-03

## 2021-10-03 VITALS
TEMPERATURE: 98.2 F | WEIGHT: 205 LBS | HEART RATE: 93 BPM | OXYGEN SATURATION: 97 % | DIASTOLIC BLOOD PRESSURE: 100 MMHG | SYSTOLIC BLOOD PRESSURE: 147 MMHG | RESPIRATION RATE: 18 BRPM | BODY MASS INDEX: 35.46 KG/M2

## 2021-10-03 DIAGNOSIS — B96.89 ACUTE BACTERIAL SINUSITIS: Primary | ICD-10-CM

## 2021-10-03 DIAGNOSIS — R09.81 CONGESTION OF PARANASAL SINUS: ICD-10-CM

## 2021-10-03 DIAGNOSIS — J01.90 ACUTE BACTERIAL SINUSITIS: Primary | ICD-10-CM

## 2021-10-03 PROCEDURE — U0005 INFEC AGEN DETEC AMPLI PROBE: HCPCS | Performed by: PHYSICIAN ASSISTANT

## 2021-10-03 PROCEDURE — U0003 INFECTIOUS AGENT DETECTION BY NUCLEIC ACID (DNA OR RNA); SEVERE ACUTE RESPIRATORY SYNDROME CORONAVIRUS 2 (SARS-COV-2) (CORONAVIRUS DISEASE [COVID-19]), AMPLIFIED PROBE TECHNIQUE, MAKING USE OF HIGH THROUGHPUT TECHNOLOGIES AS DESCRIBED BY CMS-2020-01-R: HCPCS | Performed by: PHYSICIAN ASSISTANT

## 2021-10-03 PROCEDURE — 99213 OFFICE O/P EST LOW 20 MIN: CPT | Performed by: PHYSICIAN ASSISTANT

## 2021-10-03 RX ORDER — DOXYCYCLINE HYCLATE 100 MG
100 TABLET ORAL 2 TIMES DAILY
Qty: 14 TABLET | Refills: 0 | Status: SHIPPED | OUTPATIENT
Start: 2021-10-03 | End: 2021-10-12

## 2021-10-03 ASSESSMENT — ENCOUNTER SYMPTOMS
RESPIRATORY NEGATIVE: 1
SINUS PRESSURE: 1
SORE THROAT: 0
SINUS PAIN: 1
VOICE CHANGE: 0
RHINORRHEA: 1
FACIAL SWELLING: 0
CONSTITUTIONAL NEGATIVE: 1
HEADACHES: 1
CARDIOVASCULAR NEGATIVE: 1
TROUBLE SWALLOWING: 0

## 2021-10-03 NOTE — PROGRESS NOTES
"    Assessment & Plan     Acute bacterial sinusitis  Pt having symptoms of bacterial rhinosinusitis. Symptomatic cares discussed including: pushing fluids, rest, OTC cold medication such as Afrin x 3 days, and flonase. Use a humidifier or run a hot shower to create steam 3-4 times a day for [id number] minutes at a time. Apply a warm, moist washcloth to your face 3-4 times a day. May use a Neti pot 3X per day. Follow up with PCP if no improvement in 1 week. Seek urgent medical evaluation if there are new or worsening symptoms such as fever of 104 degrees F or greater, chest tightness, wheezing, facial pressure, pain/redness/tenderness/swelling around the eyes or ears, headaches, trouble breathing, or trouble swallowing  Pt/guardian verbalized understanding and agrees with the treatment plan.      Recommend jaw maneuvers, and warm compresses (warm pack heated to body temperature, warm wash cloth), humidified air for ear pain/congestion.    You should see improvement in symptoms in 24 to 48 hours after starting antibiotic. Return to clinic if symptoms worsen or persist for more than 48 hours.     Recommend taking a probiotic at the same time you are on antibiotic. Probiotic supports and maintains digestive health while taking antibiotic.        - doxycycline hyclate (VIBRA-TABS) 100 MG tablet; Take 1 tablet (100 mg) by mouth 2 times daily for 7 days    Congestion of paranasal sinus  - Symptomatic COVID-19 Virus (Coronavirus) by PCR Nose             BMI:   Estimated body mass index is 35.46 kg/m  as calculated from the following:    Height as of 8/8/21: 1.619 m (5' 3.75\").    Weight as of this encounter: 93 kg (205 lb).           No follow-ups on file.    CARROLL Nunez [facility]    Nora Redd is a 61 year old who presents for the following health issues Patient presents with:  Sinus Problem: pressure, drainage x 2 weeks, across eyes and forehead, eyes have been " watering because of pressure         HPI   The patient is a 60 yo female with a past medical history of sinus infections who presents with complaints of sinus pressure/pain which began 2 weeks ago.  Symptoms began as watery eyes, was thought to be seasonal allergies at first. Current associated symptoms include post nasal drip, non-productive cough, sinus pressure, nasal congestion, and runny nose. The patient has been taking ibuprofen and Mucinex with some relief of symptoms, feels like Mucinex helps drain her sinuses. No concerns for breathing or swallowing, chest pain, shortness of breath, abdominal pain, joint pain or rashes, headaches, acute vision changes, fever or chills, nausea or vomiting, constipation or diarrhea, or leg pain/swelling. Normal bowel and bladder function. Normal food and fluid intake. Immunizations are up to date.  Has a history of sinus infections in the past, no sinus surgeries, no recurrent nosebleeds.            Review of Systems   Constitutional: Negative.    HENT: Positive for postnasal drip, rhinorrhea, sinus pressure and sinus pain. Negative for congestion, dental problem, drooling, ear discharge, ear pain, facial swelling, hearing loss, mouth sores, nosebleeds, sneezing, sore throat, tinnitus, trouble swallowing and voice change.    Respiratory: Negative.    Cardiovascular: Negative.    Neurological: Positive for headaches.            Objective    BP (!) 147/100   Pulse 93   Temp 98.2  F (36.8  C) (Tympanic)   Resp 18   Wt 93 kg (205 lb)   LMP 10/01/2012   SpO2 97%   BMI 35.46 kg/m    Body mass index is 35.46 kg/m .  Physical Exam  Constitutional:       General: She is not in acute distress.     Appearance: Normal appearance. She is not ill-appearing, toxic-appearing or diaphoretic.   HENT:      Head: Normocephalic and atraumatic.      Right Ear: Tympanic membrane, ear canal and external ear normal. No middle ear effusion. There is no impacted cerumen. No mastoid tenderness.  Tympanic membrane is not injected, perforated, erythematous, retracted or bulging.      Left Ear: Tympanic membrane, ear canal and external ear normal.  No middle ear effusion. There is no impacted cerumen. No mastoid tenderness. Tympanic membrane is not injected, perforated, erythematous, retracted or bulging.      Nose: Congestion and rhinorrhea present.      Right Sinus: Maxillary sinus tenderness and frontal sinus tenderness present.      Left Sinus: Maxillary sinus tenderness and frontal sinus tenderness present.      Mouth/Throat:      Mouth: Mucous membranes are moist.      Pharynx: No oropharyngeal exudate or posterior oropharyngeal erythema.   Eyes:      General: No scleral icterus.        Right eye: No discharge.         Left eye: No discharge.      Extraocular Movements: Extraocular movements intact.      Conjunctiva/sclera: Conjunctivae normal.   Cardiovascular:      Rate and Rhythm: Normal rate and regular rhythm.      Pulses: Normal pulses.      Heart sounds: Normal heart sounds. No murmur heard.     Pulmonary:      Effort: Pulmonary effort is normal. No respiratory distress.      Breath sounds: Normal breath sounds. No stridor. No wheezing or rhonchi.   Musculoskeletal:      Cervical back: Neck supple. No rigidity. No muscular tenderness.   Lymphadenopathy:      Cervical: No cervical adenopathy.      Right cervical: No superficial, deep or posterior cervical adenopathy.     Left cervical: No superficial, deep or posterior cervical adenopathy.   Skin:     General: Skin is warm and dry.   Neurological:      General: No focal deficit present.      Mental Status: She is alert and oriented to person, place, and time.      Sensory: No sensory deficit.      Motor: No weakness.      Coordination: Coordination normal.   Psychiatric:         Mood and Affect: Mood normal.         Behavior: Behavior normal.         Thought Content: Thought content normal.         Judgment: Judgment normal.

## 2021-10-03 NOTE — PATIENT INSTRUCTIONS
Pt having symptoms of bacterial rhinosinusitis. Symptomatic cares discussed including: pushing fluids, rest, OTC cold medication such as Afrin x 3 days, and flonase. Use a humidifier or run a hot shower to create steam 3-4 times a day for [id number] minutes at a time. Apply a warm, moist washcloth to your face 3-4 times a day. May use a Neti pot 3X per day. Follow up with PCP if no improvement in 1 week. Seek urgent medical evaluation if there are new or worsening symptoms such as fever of 104 degrees F or greater, chest tightness, wheezing, facial pressure, pain/redness/tenderness/swelling around the eyes or ears, headaches, trouble breathing, or trouble swallowing  Pt/guardian verbalized understanding and agrees with the treatment plan.      Recommend jaw maneuvers, and warm compresses (warm pack heated to body temperature, warm wash cloth), humidified air for ear pain/congestion.    You should see improvement in symptoms in 24 to 48 hours after starting antibiotic. Return to clinic if symptoms worsen or persist for more than 48 hours.     Recommend taking a probiotic at the same time you are on antibiotic. Probiotic supports and maintains digestive health while taking antibiotic.

## 2021-10-05 LAB — SARS-COV-2 RNA RESP QL NAA+PROBE: NEGATIVE

## 2021-10-11 ENCOUNTER — MYC MEDICAL ADVICE (OUTPATIENT)
Dept: FAMILY MEDICINE | Facility: CLINIC | Age: 61
End: 2021-10-11

## 2021-10-11 DIAGNOSIS — B96.89 ACUTE BACTERIAL SINUSITIS: ICD-10-CM

## 2021-10-11 DIAGNOSIS — J01.90 ACUTE BACTERIAL SINUSITIS: ICD-10-CM

## 2021-10-12 RX ORDER — DOXYCYCLINE HYCLATE 100 MG
100 TABLET ORAL 2 TIMES DAILY
Qty: 14 TABLET | Refills: 0 | Status: SHIPPED | OUTPATIENT
Start: 2021-10-12 | End: 2021-10-19

## 2021-10-12 NOTE — TELEPHONE ENCOUNTER
Dr. Owen,    Looks like this patient did a my chart E visit for sinus infection.  Please advise. Yaz POTTS RN

## 2021-10-12 NOTE — TELEPHONE ENCOUNTER
Reason for call:    Symptom or request:     Patient called to check on status of message.     Still having sinus pressure in right side.     [facility]      Best Time:  Any--can mychart or call    Can we leave a detailed message on this number?  YES     Mackenzie RAMIREZ  Station

## 2021-10-25 ENCOUNTER — E-VISIT (OUTPATIENT)
Dept: URGENT CARE | Facility: CLINIC | Age: 61
End: 2021-10-25
Payer: COMMERCIAL

## 2021-10-25 DIAGNOSIS — R05.9 COUGH: Primary | ICD-10-CM

## 2021-10-25 PROCEDURE — 99421 OL DIG E/M SVC 5-10 MIN: CPT

## 2021-10-25 NOTE — PATIENT INSTRUCTIONS
"  Dear Ivania Antonio    After reviewing your responses, I've been able to diagnose you with an upper respiratory infection which is a common infection of your lungs that is nearly always caused by a virus.  It likely started in the sinuses. The virus causes swelling and irritation of the air passages of your lungs which leads to cough. The illness spreads from your nose and throat to your windpipe and airways.  Right now you have a lot of the post nasal drainage cough it sounds like. This can last up to 2-3 weeks, but is not a serious illness. Exposure to cigarette smoke usually makes this significantly worse.     The main thing to do is drink lots of fluids and rest. Cough medications over-the-counter such as mucinex, robitussin or \"cold and sinus\" medications can be helpful. Ibuprofen and Tylenol also help with fevers or aching feelings that you often have with this kind of illness. Do not take ibuprofen if you have kidney disease, stomach ulcers or allergy to aspirin. If it is a lot of post nasal drainage bothering you then do try Afrin decongestant nasal spray to stop the drainage, but you can only use this for 3 days max or your nose gets addicted.  You can add nasal saline rinses to rinse the mucus out of the sinuses, many times a day, just not right after any other nasal spray or it will rinse it right out.    Thank you for doing testing for COVID, if you've had new symtpoms or worsening cough we should do that testing again, let me know.      If your symptoms worsen, you develop chest pain or shortness of breath, fevers over 101, or are not improving in 5 days, please contact your primary care provider for an appointment or visit any of our convenient Walk-in Care or Urgent Care Centers to be seen which can be found on our website here.    Thanks again for choosing us as your health care partner,    Ramiro Owen MD  "

## 2021-11-17 ENCOUNTER — OFFICE VISIT (OUTPATIENT)
Dept: URGENT CARE | Facility: URGENT CARE | Age: 61
End: 2021-11-17
Payer: COMMERCIAL

## 2021-11-17 VITALS
HEART RATE: 106 BPM | DIASTOLIC BLOOD PRESSURE: 78 MMHG | SYSTOLIC BLOOD PRESSURE: 130 MMHG | OXYGEN SATURATION: 96 % | RESPIRATION RATE: 16 BRPM | TEMPERATURE: 98 F | BODY MASS INDEX: 35.05 KG/M2 | WEIGHT: 202.6 LBS

## 2021-11-17 DIAGNOSIS — R05.9 COUGH: Primary | ICD-10-CM

## 2021-11-17 DIAGNOSIS — J01.90 ACUTE SINUSITIS WITH SYMPTOMS > 10 DAYS: ICD-10-CM

## 2021-11-17 PROCEDURE — U0005 INFEC AGEN DETEC AMPLI PROBE: HCPCS | Mod: 90 | Performed by: FAMILY MEDICINE

## 2021-11-17 PROCEDURE — U0003 INFECTIOUS AGENT DETECTION BY NUCLEIC ACID (DNA OR RNA); SEVERE ACUTE RESPIRATORY SYNDROME CORONAVIRUS 2 (SARS-COV-2) (CORONAVIRUS DISEASE [COVID-19]), AMPLIFIED PROBE TECHNIQUE, MAKING USE OF HIGH THROUGHPUT TECHNOLOGIES AS DESCRIBED BY CMS-2020-01-R: HCPCS | Mod: 90 | Performed by: FAMILY MEDICINE

## 2021-11-17 PROCEDURE — 99213 OFFICE O/P EST LOW 20 MIN: CPT | Performed by: FAMILY MEDICINE

## 2021-11-17 PROCEDURE — 99000 SPECIMEN HANDLING OFFICE-LAB: CPT | Performed by: FAMILY MEDICINE

## 2021-11-17 RX ORDER — AZITHROMYCIN 250 MG/1
TABLET, FILM COATED ORAL
Qty: 6 TABLET | Refills: 0 | Status: SHIPPED | OUTPATIENT
Start: 2021-11-17 | End: 2022-03-30

## 2021-11-17 NOTE — PROGRESS NOTES
SUBJECTIVE:  Ivania Antonio is a 61 year old female here with concerns about sinus infection.  She states onset of symptoms were 3 day(s) ago.  She has had maxillary, frontal pressure. Course of illness is worsening. Severity moderate  Current and Associated symptoms: cough  and facial pain/pressure  Predisposing factors include HX of recurrent sinusitis. Recent treatment has included: OTC meds and Steroid nasal spray    Past Medical History:   Diagnosis Date     ASCUS with positive high risk HPV 4/5/2015    3/25/2015:Pap--ASCUS, +HR HPV. Plan [facility]-      H/O colposcopy with cervical biopsy 4/17/2015    Bx & ECC - Negative     Hypertrophy of breast      Current Outpatient Medications   Medication Sig Dispense Refill     azithromycin (ZITHROMAX Z-REJI) 250 MG tablet 2 tabs day one, then 1 tab daily 6 tablet 0     Multiple Vitamin (MULTIVITAMINS PO) Take 1 tablet by mouth daily        betamethasone dipropionate (DIPROSONE) 0.05 % external cream Apply topically 2 times daily (Patient not taking: Reported on 11/17/2021) 15 g 1     valACYclovir (VALTREX) 1000 mg tablet Take 1 tablet (1,000 mg) by mouth 3 times daily for 7 days 21 tablet 0     History   Smoking Status     Never Smoker   Smokeless Tobacco     Never Used       ROS:  Review of systems negative except as stated above.    OBJECTIVE:  /78 (BP Location: Right arm, Patient Position: Sitting, Cuff Size: Adult Large)   Pulse 106   Temp 98  F (36.7  C) (Tympanic)   Resp 16   Wt 91.9 kg (202 lb 9.6 oz)   LMP 10/01/2012   SpO2 96%   BMI 35.05 kg/m    Exam:GENERAL APPEARANCE: healthy, alert and no distress  EYES: EOMI,  PERRL, conjunctiva clear  HENT: TM's normal bilaterally, nasal turbinates erythematous, swollen and rhinorrhea yellow  NECK: supple, nontender, no lymphadenopathy  RESP: lungs clear to auscultation - no rales, rhonchi or wheezes  CV: regular rates and rhythm, normal S1 S2, no murmur noted  ABDOMEN:  soft, nontender, no HSM or masses and  bowel sounds normal  NEURO: Normal strength and tone, sensory exam grossly normal,  normal speech and mentation  SKIN: no suspicious lesions or rashes    ASSESSMENT:  Sinusitis    PLAN: zithromax, fluids, rest  Orders Placed This Encounter   Procedures     Symptomatic COVID-19 Virus (Coronavirus) by PCR Nose     Follow up with primary care provider if not improving.

## 2021-11-18 LAB
SARS-COV-2 RNA RESP QL NAA+PROBE: DETECTED
SARS-COV-2 RNA RESP QL NAA+PROBE: NORMAL

## 2022-01-26 ENCOUNTER — PATIENT OUTREACH (OUTPATIENT)
Dept: FAMILY MEDICINE | Facility: CLINIC | Age: 62
End: 2022-01-26
Payer: COMMERCIAL

## 2022-03-20 ENCOUNTER — HEALTH MAINTENANCE LETTER (OUTPATIENT)
Age: 62
End: 2022-03-20

## 2022-03-29 NOTE — PATIENT INSTRUCTIONS
1. BP recheck  2. Shingles  3. FIT test  go  Preventive Health Recommendations  Female Ages 50 - 64    Yearly exam: See your health care provider every year in order to  o Review health changes.   o Discuss preventive care.    o Review your medicines if your doctor has prescribed any.      Get a Pap test every three years (unless you have an abnormal result and your provider advises testing more often).    If you get Pap tests with HPV test, you only need to test every 5 years, unless you have an abnormal result.     You do not need a Pap test if your uterus was removed (hysterectomy) and you have not had cancer.    You should be tested each year for STDs (sexually transmitted diseases) if you're at risk.     Have a mammogram every 1 to 2 years.    Have a colonoscopy at age 50, or have a yearly FIT test (stool test). These exams screen for colon cancer.      Have a cholesterol test every 5 years, or more often if advised.    Have a diabetes test (fasting glucose) every three years. If you are at risk for diabetes, you should have this test more often.     If you are at risk for osteoporosis (brittle bone disease), think about having a bone density scan (DEXA).    Shots: Get a flu shot each year. Get a tetanus shot every 10 years.    Nutrition:     Eat at least 5 servings of fruits and vegetables each day.    Eat whole-grain bread, whole-wheat pasta and brown rice instead of white grains and rice.    Get adequate Calcium and Vitamin D.     Lifestyle    Exercise at least 150 minutes a week (30 minutes a day, 5 days a week). This will help you control your weight and prevent disease.    Limit alcohol to one drink per day.    No smoking.     Wear sunscreen to prevent skin cancer.     See your dentist every six months for an exam and cleaning.    See your eye doctor every 1 to 2 years.    Patient Education     MyPlate Worksheet: 1,600 Calories    Your calorie needs are about 1,600 calories a day. Below are the USDA  guidelines for your daily recommended amount of each food group.   Vegetables 2 cups Fruits 1  cups Grains 5 ounces Dairy 3 cups Protein 5 ounces   Eat a variety of vegetables each day.  Aim for these amounts each week:    1  cups dark green vegetables    4 cups red or orange-colored vegetables    1 cup dry beans and peas    4 cups starchy vegetables    3  cups other vegetables Eat a variety of fruits each day.  Go easy on fruit juices.  Good choices of fruits include:    Berries    Bananas    Apples    Melon    Dried fruit    Frozen fruit    Canned fruit Choose whole grains whenever you can.  Aim to eat at least 2  ounces of whole grains each day:    Bread    Cereal    Rice    Pasta    Potatoes    Tortillas Choose low-fat or fat-free milk, yogurt, or cheese each day.  Good choices include:    Low-fat or fat-free milk or chocolate milk    Low-fat or fat-free yogurt    Low-fat or fat-free cottage cheese or other reduced-fat cheeses    Calcium-fortified milk alternatives Choose low-fat or lean meats, poultry, fish, and seafood each day.   Vary your protein. Choose more:    Fish and other seafood    Lean low-fat meat and poultry    Eggs    Beans, peas    Tofu    Unsalted nuts and seeds  Choose less high-fat and red meat.   Source: USDA MyPlate, www.choosemyplate.gov  Know your limits on sodium, saturated fat, and added sugars     Your allowance for saturated fat is 18 grams a day.    Limit the added sugars to 40 grams a day.    Cut back on salt (sodium). Stay under 2,300 mg sodium a day. If you have a health condition such as heart disease or high blood pressure, your doctor will likely tell you to limit sodium to no more than 1,500 mg a day.    Get moving and be active!  Aim for at least 30 minutes of physical activity most days of the week or 150 minutes of moderate exercise a week.   MyPlate servings worksheet: 1,600 calories  This worksheet tells you how many servings you should get each day from each food group,  and tells you how much food makes a serving. Use this as a guide as you plan your meals throughout the day. Track your progress daily by writing in what you actually ate.   Food Group  Daily MyPlate Goal What You Ate Today   Vegetables 4 half-cups or 4 servings  One serving is:    cup cut-up raw or cooked vegetables  1 cup raw, leafy vegetables    baked sweet potato    cup vegetable juice  Note: At meals, fill half your plate with vegetables and fruit and eat them first.      Fruits 3 half-cups or 3 servings  One serving is:    cup fresh, frozen, or canned fruit  1 medium piece of fruit  1 cup of berries or melon    cup dried fruit    cup 100% fruit juice  Note: Make most choices fruit instead of juice.      Grains 5 servings or 5 ounces  One serving is:  1 slice bread  1 cup dry cereal    cup cooked rice, pasta, or cereal  1 5-inch tortilla  Note: Choose whole grains for at least half of your servings each day.      Dairy 3 servings or 3 cups  One serving is:  1 cup milk  1  ounces reduced-fat hard cheese  2 ounces processed cheese  1 cup low-fat yogurt  1/3 cup shredded cheese  Note: Choose low-fat or fat-free most often.      Protein 5 servings or 5 ounces  One serving is:  1 ounce cooked lean beef, pork, lamb, or ham  1 ounce cooked chicken or turkey (no skin)  1 ounce cooked fish or shellfish (not fried)  1 egg    cup egg substitute    ounce nuts or seeds  1 tablespoon peanut or almond butter    cup cooked dry beans or peas    cup tofu  2 tablespoons hummus      StayWell last reviewed this educational content on 6/1/2020 2000-2021 [employer]. All rights reserved. This information is not intended as a substitute for professional medical care. Always follow your healthcare professional's instructions.

## 2022-03-29 NOTE — PROGRESS NOTES
SUBJECTIVE:   CC: Ivania Antonio is an 61 year old woman who presents for preventive health visit.     Patient has been advised of split billing requirements and indicates understanding: Yes  Healthy Habits:     Getting at least 3 servings of Calcium per day:  Yes    Bi-annual eye exam:  Yes    Dental care twice a year:  Yes    Sleep apnea or symptoms of sleep apnea:  None    Diet:  Regular (no restrictions)    Frequency of exercise:  2-3 days/week    Duration of exercise:  15-30 minutes    Taking medications regularly:  Yes    Barriers to taking medications:  None    Medication side effects:  None    PHQ-2 Total Score: 0    Additional concerns today:  No (discuss shingrix )        Today's PHQ-2 Score:   PHQ-2 ( 1999 Pfizer) 2/1/2022   Q1: Little interest or pleasure in doing things 0   Q2: Feeling down, depressed or hopeless 0   PHQ-2 Score 0   PHQ-2 Total Score (12-17 Years)- Positive if 3 or more points; Administer PHQ-A if positive -   Q1: Little interest or pleasure in doing things Not at all   Q2: Feeling down, depressed or hopeless Not at all   PHQ-2 Score 0       Abuse: Current or Past (Physical, Sexual or Emotional) - No  Do you feel safe in your environment? Yes        Social History     Tobacco Use     Smoking status: Never Smoker     Smokeless tobacco: Never Used   Substance Use Topics     Alcohol use: Yes     Comment: ocass. 4 beers a month     If you drink alcohol do you typically have >3 drinks per day or >7 drinks per week? No    Alcohol Use 2/1/2022   Prescreen: >3 drinks/day or >7 drinks/week? No   Prescreen: >3 drinks/day or >7 drinks/week? -       Reviewed orders with patient.  Reviewed health maintenance and updated orders accordingly - Yes  BP Readings from Last 3 Encounters:   03/30/22 (!) 130/96   11/17/21 130/78   10/03/21 (!) 147/100    Wt Readings from Last 3 Encounters:   03/30/22 92.1 kg (203 lb)   11/17/21 91.9 kg (202 lb 9.6 oz)   10/03/21 93 kg (205 lb)                    Breast  Cancer Screening:  Any new diagnosis of family breast, ovarian, or bowel cancer? No    Breast CA Risk Assessment (FHS-7) 2/9/2021   Do you have a family history of breast, colon, or ovarian cancer? No / Unknown         Mammogram Screening: Recommended mammography every 1-2 years with patient discussion and risk factor consideration  Pertinent mammograms are reviewed under the imaging tab.    History of abnormal Pap smear: YES - other categories - see link Cervical Cytology Screening Guidelines  3/25/2015:Pap--ASCUS, +HR HPV. Plan [facility]-  4/17/15: Negative, ECC negative.  COTESTING in 1 year  3/28/17:Pap: NIL/neg HPV. Repeat Pap+HPV in 3 yrs (2020)  2/12/21 NIL pap, + HR HPV (not 16 or 18). Plan cotest in 1 year due bef 2/12/22.  2/19/21 Result letter sent to pt via [facility]. Pt read results.  1/26/22 Reminder [facility]  3/30/22 Appt  PAP / HPV Latest Ref Rng & Units 2/12/2021 3/28/2017 3/25/2015   PAP (Historical) - NIL NIL ASC-US(A)   HPV16 NEG:Negative Negative Negative Negative   HPV18 NEG:Negative Negative Negative Negative   HRHPV NEG:Negative Positive(A) Negative Positive(A)     Reviewed and updated as needed this visit by clinical staff   Tobacco  Allergies  Meds   Med Hx  Surg Hx  Fam Hx  Soc Hx        Reviewed and updated as needed this visit by Provider                     Review of Systems   Constitutional: Negative for chills and fever.   HENT: Negative for congestion, ear pain, hearing loss and sore throat.    Eyes: Negative for pain and visual disturbance.   Respiratory: Negative for cough and shortness of breath.    Cardiovascular: Negative for chest pain, palpitations and peripheral edema.   Gastrointestinal: Negative for abdominal pain, constipation, diarrhea, heartburn, hematochezia and nausea.   Breasts:  Negative for tenderness, breast mass and discharge.   Genitourinary: Negative for dysuria, frequency, genital sores, hematuria, pelvic pain, urgency, vaginal bleeding and vaginal discharge.  "  Musculoskeletal: Negative for arthralgias, joint swelling and myalgias.   Skin: Negative for rash.   Neurological: Negative for dizziness, weakness, headaches and paresthesias.   Psychiatric/Behavioral: Negative for mood changes. The patient is not nervous/anxious.           OBJECTIVE:   BP (!) 138/92   Pulse 104   Temp 97.2  F (36.2  C) (Tympanic)   Resp 16   Ht 1.621 m (5' 3.8\")   Wt 92.1 kg (203 lb)   LMP 10/01/2012   SpO2 96%   BMI 35.06 kg/m    Physical Exam  GENERAL APPEARANCE: healthy, alert and no distress  EYES: Eyes grossly normal to inspection, PERRL and conjunctivae and sclerae normal  HENT: ear canals and TM's normal, mask in place.  NECK: no adenopathy, no asymmetry, masses, or scars and thyroid normal to palpation  RESP: lungs clear to auscultation - no rales, rhonchi or wheezes  BREAST: scars from breast reduction bilaterally normal without masses, tenderness or nipple discharge and no palpable axillary masses or adenopathy  CV: regular rate and rhythm, normal S1 S2, no S3 or S4, no murmur, click or rub, no peripheral edema and peripheral pulses strong  ABDOMEN: soft, nontender, no hepatosplenomegaly, no masses and bowel sounds normal   (female): normal female external genitalia, normal urethral meatus, vaginal mucosal atrophy noted, normal cervix without masses or abnormal discharge  MS: no musculoskeletal defects are noted and gait is age appropriate without ataxia  SKIN: no suspicious lesions or rashes  NEURO: Normal strength and tone, sensory exam grossly normal, mentation intact and speech normal  PSYCH: mentation appears normal and affect normal/bright    Diagnostic Test Results:  Labs reviewed in Epic    ASSESSMENT/PLAN:   Ivania was seen today for physical.    Diagnoses and all orders for this visit:    Routine general medical examination at a health care facility    Screening for HIV (human immunodeficiency virus): discussed.    Screen for colon cancer  -     Fecal colorectal " "cancer screen FIT - Future (S+30); Future    Cervical cancer screening  -     PAP screen with HPV - recommended age 30 - 65 years    Need for vaccination  -     SHINGRIX [[id number]]    Other orders  -     REVIEW OF HEALTH MAINTENANCE PROTOCOL ORDERS  -     SCREENING QUESTIONS FOR ADULT IMMUNIZATIONS    High BP today: roads were very bad driving in today.    Patient has been advised of split billing requirements and indicates understanding: Yes    COUNSELING:  Reviewed preventive health counseling, as reflected in patient instructions       Regular exercise       Healthy diet/nutrition       Vision screening       Osteoporosis prevention/bone health       HIV screeninx in teen years, 1x in adult years, and at intervals if high risk    Estimated body mass index is 35.06 kg/m  as calculated from the following:    Height as of this encounter: 1.621 m (5' 3.8\").    Weight as of this encounter: 92.1 kg (203 lb).  Wt Readings from Last 4 Encounters:   22 92.1 kg (203 lb)   21 91.9 kg (202 lb 9.6 oz)   10/03/21 93 kg (205 lb)   21 92.7 kg (204 lb 6.4 oz)       Weight management plan: Discussed healthy diet and exercise guidelines    She reports that she has never smoked. She has never used smokeless tobacco.      Counseling Resources:  ATP IV Guidelines  Pooled Cohorts Equation Calculator  Breast Cancer Risk Calculator  BRCA-Related Cancer Risk Assessment: FHS-7 Tool  FRAX Risk Assessment  ICSI Preventive Guidelines  Dietary Guidelines for Americans,   USDA's MyPlate  ASA Prophylaxis  Lung CA Screening    Ramiro Owen MD  [facility]  "

## 2022-03-30 ENCOUNTER — OFFICE VISIT (OUTPATIENT)
Dept: FAMILY MEDICINE | Facility: CLINIC | Age: 62
End: 2022-03-30
Payer: COMMERCIAL

## 2022-03-30 VITALS
SYSTOLIC BLOOD PRESSURE: 138 MMHG | OXYGEN SATURATION: 96 % | WEIGHT: 203 LBS | RESPIRATION RATE: 16 BRPM | BODY MASS INDEX: 34.66 KG/M2 | DIASTOLIC BLOOD PRESSURE: 92 MMHG | TEMPERATURE: 97.2 F | HEART RATE: 104 BPM | HEIGHT: 64 IN

## 2022-03-30 DIAGNOSIS — Z23 NEED FOR VACCINATION: ICD-10-CM

## 2022-03-30 DIAGNOSIS — Z11.4 SCREENING FOR HIV (HUMAN IMMUNODEFICIENCY VIRUS): ICD-10-CM

## 2022-03-30 DIAGNOSIS — Z00.00 ROUTINE GENERAL MEDICAL EXAMINATION AT A HEALTH CARE FACILITY: Primary | ICD-10-CM

## 2022-03-30 DIAGNOSIS — Z12.11 SCREEN FOR COLON CANCER: ICD-10-CM

## 2022-03-30 DIAGNOSIS — Z12.4 CERVICAL CANCER SCREENING: ICD-10-CM

## 2022-03-30 PROCEDURE — 99396 PREV VISIT EST AGE 40-64: CPT | Mod: 25 | Performed by: FAMILY MEDICINE

## 2022-03-30 PROCEDURE — 90471 IMMUNIZATION ADMIN: CPT | Performed by: FAMILY MEDICINE

## 2022-03-30 PROCEDURE — G0145 SCR C/V CYTO,THINLAYER,RESCR: HCPCS | Performed by: FAMILY MEDICINE

## 2022-03-30 PROCEDURE — 90750 HZV VACC RECOMBINANT IM: CPT | Performed by: FAMILY MEDICINE

## 2022-03-30 PROCEDURE — 87624 HPV HI-RISK TYP POOLED RSLT: CPT | Performed by: FAMILY MEDICINE

## 2022-03-30 ASSESSMENT — PAIN SCALES - GENERAL: PAINLEVEL: NO PAIN (0)

## 2022-03-30 ASSESSMENT — ENCOUNTER SYMPTOMS
CHILLS: 0
HEADACHES: 0
JOINT SWELLING: 0
ABDOMINAL PAIN: 0
CONSTIPATION: 0
DIZZINESS: 0
PARESTHESIAS: 0
PALPITATIONS: 0
SORE THROAT: 0
WEAKNESS: 0
COUGH: 0
MYALGIAS: 0
DYSURIA: 0
BREAST MASS: 0
HEMATURIA: 0
FEVER: 0
EYE PAIN: 0
FREQUENCY: 0
HEARTBURN: 0
HEMATOCHEZIA: 0
SHORTNESS OF BREATH: 0
ARTHRALGIAS: 0
NERVOUS/ANXIOUS: 0
DIARRHEA: 0
NAUSEA: 0

## 2022-04-01 LAB
BKR LAB AP GYN ADEQUACY: NORMAL
BKR LAB AP GYN INTERPRETATION: NORMAL
BKR LAB AP HPV REFLEX: NORMAL
BKR LAB AP PREVIOUS ABNL DX: NORMAL
BKR LAB AP PREVIOUS ABNORMAL: NORMAL
PATH REPORT.COMMENTS IMP SPEC: NORMAL
PATH REPORT.COMMENTS IMP SPEC: NORMAL
PATH REPORT.RELEVANT HX SPEC: NORMAL

## 2022-04-05 ENCOUNTER — PATIENT OUTREACH (OUTPATIENT)
Dept: FAMILY MEDICINE | Facility: CLINIC | Age: 62
End: 2022-04-05
Payer: COMMERCIAL

## 2022-04-05 LAB
HUMAN PAPILLOMA VIRUS 16 DNA: NEGATIVE
HUMAN PAPILLOMA VIRUS 18 DNA: NEGATIVE
HUMAN PAPILLOMA VIRUS FINAL DIAGNOSIS: ABNORMAL
HUMAN PAPILLOMA VIRUS OTHER HR: POSITIVE

## 2022-04-28 ENCOUNTER — OFFICE VISIT (OUTPATIENT)
Dept: OBGYN | Facility: CLINIC | Age: 62
End: 2022-04-28
Payer: COMMERCIAL

## 2022-04-28 VITALS
DIASTOLIC BLOOD PRESSURE: 110 MMHG | HEART RATE: 108 BPM | BODY MASS INDEX: 36.07 KG/M2 | HEIGHT: 63 IN | WEIGHT: 203.6 LBS | TEMPERATURE: 97.8 F | SYSTOLIC BLOOD PRESSURE: 164 MMHG | RESPIRATION RATE: 18 BRPM

## 2022-04-28 DIAGNOSIS — R87.810 CERVICAL HIGH RISK HPV (HUMAN PAPILLOMAVIRUS) TEST POSITIVE: Primary | ICD-10-CM

## 2022-04-28 DIAGNOSIS — R03.0 ELEVATED BLOOD PRESSURE READING WITHOUT DIAGNOSIS OF HYPERTENSION: ICD-10-CM

## 2022-04-28 PROCEDURE — 88305 TISSUE EXAM BY PATHOLOGIST: CPT | Performed by: PATHOLOGY

## 2022-04-28 PROCEDURE — 99202 OFFICE O/P NEW SF 15 MIN: CPT | Mod: 25 | Performed by: OBSTETRICS & GYNECOLOGY

## 2022-04-28 PROCEDURE — 57456 ENDOCERV CURETTAGE W/SCOPE: CPT | Performed by: OBSTETRICS & GYNECOLOGY

## 2022-04-28 NOTE — PROGRESS NOTES
Ivania presents for colposcopy.    Pap hx:  3/25/15 ASCUS, +HR HPV. Plan [facility]  4/17/15 Colpo ECC negative. Cotesting in 1 year  3/28/17 NIL/neg HPV. Repeat cotest in 3 yrs   2/12/21 NIL pap, + HR HPV (not 16 or 18). Plan cotest in 1 year due bef 2/12/22.  3/30/22 NIL pap, +HR HPV (not 16/18). Plan: colposcopy due before 6/30/22     Non-Smoker.      No immunosuppression.  No new sexual partner.   UPT: menopausal     PMH/PSH/Meds/Allergies reviewed & documented in A2B.r  Procedure for colposcopy and biopsy has been explained to the   patient and consent obtained.    PROCEDURE:  Speculum placed in vagina and excellent visualization of cervix achieved, cervix swabbed x 3 with acetic acid solution.    FINDINGS:  Cervix: no visible lesions; SCJ visualized 360 degrees without lesions, endocervical curettage performed and specimen labelled and sent to pathology.    Vaginal inspection: normal without visible lesions.    Vulvar colposcopy: vulvar colposcopy not performed.    Procedure Summary: Patient tolerated procedure well and colposcopy adequate.    Colposcopic Impression: benign appearance    Plan: Specimens labelled and sent to pathology. and Will base further treatment on pathology findings.    Additionally, patient blood pressure was significantly elevated today.  Recommended self check at home with 's BP cuff and follow up with PCP.    Ivania Griffith M.D.

## 2022-05-02 LAB
PATH REPORT.COMMENTS IMP SPEC: NORMAL
PATH REPORT.COMMENTS IMP SPEC: NORMAL
PATH REPORT.FINAL DX SPEC: NORMAL
PATH REPORT.GROSS SPEC: NORMAL
PATH REPORT.MICROSCOPIC SPEC OTHER STN: NORMAL
PATH REPORT.RELEVANT HX SPEC: NORMAL
PHOTO IMAGE: NORMAL

## 2022-05-03 ENCOUNTER — HOSPITAL ENCOUNTER (OUTPATIENT)
Dept: MAMMOGRAPHY | Facility: CLINIC | Age: 62
Discharge: HOME | End: 2022-05-03
Attending: FAMILY MEDICINE | Admitting: FAMILY MEDICINE
Payer: COMMERCIAL

## 2022-05-03 DIAGNOSIS — Z12.31 VISIT FOR SCREENING MAMMOGRAM: ICD-10-CM

## 2022-05-03 PROCEDURE — 77067 SCR MAMMO BI INCL CAD: CPT

## 2022-05-04 ENCOUNTER — PATIENT OUTREACH (OUTPATIENT)
Dept: OBGYN | Facility: CLINIC | Age: 62
End: 2022-05-04
Payer: COMMERCIAL

## 2022-05-24 ENCOUNTER — OFFICE VISIT (OUTPATIENT)
Dept: OBGYN | Facility: CLINIC | Age: 62
End: 2022-05-24
Payer: COMMERCIAL

## 2022-05-24 VITALS
TEMPERATURE: 97.3 F | HEIGHT: 63 IN | DIASTOLIC BLOOD PRESSURE: 92 MMHG | SYSTOLIC BLOOD PRESSURE: 158 MMHG | RESPIRATION RATE: 16 BRPM | WEIGHT: 202.6 LBS | HEART RATE: 86 BPM | BODY MASS INDEX: 35.9 KG/M2

## 2022-05-24 DIAGNOSIS — R87.810 CERVICAL HIGH RISK HPV (HUMAN PAPILLOMAVIRUS) TEST POSITIVE: Primary | ICD-10-CM

## 2022-05-24 PROCEDURE — 88307 TISSUE EXAM BY PATHOLOGIST: CPT | Performed by: PATHOLOGY

## 2022-05-24 PROCEDURE — 57460 BX OF CERVIX W/SCOPE LEEP: CPT | Performed by: OBSTETRICS & GYNECOLOGY

## 2022-05-24 PROCEDURE — 88305 TISSUE EXAM BY PATHOLOGIST: CPT | Performed by: PATHOLOGY

## 2022-05-24 NOTE — PROGRESS NOTES
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  The LEEP procedure was explained. Discussed possible risks including cervical incompetence, infection, bleeding, discomfort, and possible incomplete excision of the abnormal tissue so close follow up was necessary. Consent was obtained and all questions were answered.    Urine Pregnancy Test: not indicated based on menopausal status     Procedure: Patient was placed in the lithotomy position and the grounding pad was placed on her leg. A coated speculum was used to position the cervix in the midline position.  Under colposcopic visualization, the cervix was cleansed with acetic acid to define the area of abnormality.  Using 1% lidocaine with epinephrine, intracervical injections were performed circumferentially.  A total of 10 cc was used (but most of it did not make it into the cervix as it was quite mobile).  After adequate anesthesia, a 10 mm loop electrode was used to excise the abnormal portion of the cervix.  An ECC was performed after excision of the transformation zone.  The 5 mm ball cautery was used to fulgurate the cut surface. With bleeding fairly well controlled, Monsels solution was applied to ensure hemostasis. The patient tolerated the procedure well.    A:  LEEP for endocervical dysplasia    P:   Post-LEEP instructions were given to the patient.  She was told to expect heavy discharge for the first 4-7 days and could continue for 2 weeks. Nothing in the vagina and no intercourse for 4-6 weeks.  No heavy lifting for next few days.  Return to clinic if any signs of infection.     Hypertension: encouraged follow up with PCP    Ivania Griffith M.D.

## 2022-05-24 NOTE — NURSING NOTE
"Initial BP (!) 158/92 (BP Location: Right arm, Patient Position: Chair, Cuff Size: Adult Regular)   Pulse 86   Temp 97.3  F (36.3  C) (Tympanic)   Resp 16   Ht 1.6 m (5' 3\")   Wt 91.9 kg (202 lb 9.6 oz)   LMP 10/01/2012   Breastfeeding No   BMI 35.89 kg/m   Estimated body mass index is 35.89 kg/m  as calculated from the following:    Height as of this encounter: 1.6 m (5' 3\").    Weight as of this encounter: 91.9 kg (202 lb 9.6 oz). .    Noris Grubbs, SIVA    "

## 2022-05-26 LAB
PATH REPORT.COMMENTS IMP SPEC: NORMAL
PATH REPORT.FINAL DX SPEC: NORMAL
PATH REPORT.FINAL DX SPEC: NORMAL
PATH REPORT.GROSS SPEC: NORMAL
PATH REPORT.GROSS SPEC: NORMAL
PATH REPORT.MICROSCOPIC SPEC OTHER STN: NORMAL
PATH REPORT.MICROSCOPIC SPEC OTHER STN: NORMAL
PATH REPORT.RELEVANT HX SPEC: NORMAL
PATH REPORT.RELEVANT HX SPEC: NORMAL
PHOTO IMAGE: NORMAL
PHOTO IMAGE: NORMAL

## 2022-05-27 ENCOUNTER — PATIENT OUTREACH (OUTPATIENT)
Dept: OBGYN | Facility: CLINIC | Age: 62
End: 2022-05-27
Payer: COMMERCIAL

## 2022-09-10 ENCOUNTER — HEALTH MAINTENANCE LETTER (OUTPATIENT)
Age: 62
End: 2022-09-10

## 2022-11-16 ENCOUNTER — PATIENT OUTREACH (OUTPATIENT)
Dept: OBGYN | Facility: CLINIC | Age: 62
End: 2022-11-16

## 2022-11-16 NOTE — TELEPHONE ENCOUNTER
Panel Management Review        Health Maintenance List    Health Maintenance   Topic Date Due     COVID-19 Vaccine (1) Never done     HIV SCREENING  Never done     COLORECTAL CANCER SCREENING  02/22/2022     INFLUENZA VACCINE (1) 09/01/2022     YEARLY PREVENTIVE VISIT  03/30/2023     PAP FOLLOW-UP  05/24/2023     HPV FOLLOW-UP  05/24/2023     MAMMO SCREENING  05/03/2024     DTAP/TDAP/TD IMMUNIZATION (3 - Td or Tdap) 03/25/2025     LIPID  02/12/2026     ADVANCE CARE PLANNING  02/12/2026     HEPATITIS C SCREENING  Completed     PHQ-2 (once per calendar year)  Completed     ZOSTER IMMUNIZATION  Addressed     Pneumococcal Vaccine: Pediatrics (0 to 5 Years) and At-Risk Patients (6 to 64 Years)  Aged Out     IPV IMMUNIZATION  Aged Out     MENINGITIS IMMUNIZATION  Aged Out     PAP  Discontinued       Composite cancer screening  Chart review shows that this patient is due/due soon for the following Colonoscopy  Lab Results   Component Value Date    PAP NIL 02/12/2021     Past Surgical History:   Procedure Laterality Date     BIOPSY BREAST Left 4/2/2015    Procedure: BIOPSY BREAST;  Surgeon: Otoniel Jimenez MD;  Location: [facility] OR     GALLBLADDER SURGERY  age 31    laparoscopic     MAMMOPLASTY REDUCTION BILATERAL  5/20/2014    Procedure: MAMMOPLASTY REDUCTION BILATERAL;  Surgeon: Sue Guerra MD;  Location: [facility] OR       Is hysterectomy listed in surgical history? No   Is mastectomy listed in surgical history? No     Summary:    Patient is due/failing the following:   Colonoscopy    Action needed: Patient needs office visit for colonoscopy.    Type of outreach:  Sent "[employer] message.      Staff Signature:  Noris Grubbs CMA

## 2022-11-16 NOTE — LETTER
2022      Ivania Antonio  [address] [place] [address]              Dear Ivania,    To ensure we are providing the best quality care, we have reviewed your chart and see that you are due for:    Colon Cancer Screening:    If you have received a referral to schedule a Colonoscopy or choose to do a Colonoscopy instead of the FIT/ Cologuard test, please call [phone] to schedule.    If you have received a FIT test kit from a prior office visit, please check the expiration date. If , please get a new kit from the Lab/ Clinic. If not , please complete the test and mail in as soon as you are able.    If you would prefer to complete a Cologuard test, please reach out to your provider to see if this is an option for you.    You may call Dept: [phone] if you have any questions. If you have completed the tests outside of [facility], please have the results forwarded to our office Fax # [phone]. We will update the chart for your primary Physician to review before your next annual physical.        Sincerely,      [facility]

## 2022-11-21 ENCOUNTER — MYC MEDICAL ADVICE (OUTPATIENT)
Dept: FAMILY MEDICINE | Facility: CLINIC | Age: 62
End: 2022-11-21

## 2022-11-21 DIAGNOSIS — Z12.11 COLON CANCER SCREENING: ICD-10-CM

## 2022-11-21 DIAGNOSIS — Z12.4 SCREENING FOR CERVICAL CANCER: ICD-10-CM

## 2022-11-25 NOTE — TELEPHONE ENCOUNTER
Routed to provider.  Please see MyChart message from pt asking for new order for FIT test.  Bri Mota RN

## 2022-12-08 DIAGNOSIS — Z12.11 COLON CANCER SCREENING: Primary | ICD-10-CM

## 2022-12-11 ENCOUNTER — LAB (OUTPATIENT)
Dept: LAB | Facility: CLINIC | Age: 62
End: 2022-12-11
Payer: COMMERCIAL

## 2022-12-11 DIAGNOSIS — Z12.11 COLON CANCER SCREENING: ICD-10-CM

## 2022-12-11 PROCEDURE — 82274 ASSAY TEST FOR BLOOD FECAL: CPT

## 2022-12-14 LAB — HEMOCCULT STL QL IA: NEGATIVE

## 2023-04-14 ENCOUNTER — E-VISIT (OUTPATIENT)
Dept: FAMILY MEDICINE | Facility: CLINIC | Age: 63
End: 2023-04-14
Payer: COMMERCIAL

## 2023-04-14 ENCOUNTER — MYC MEDICAL ADVICE (OUTPATIENT)
Dept: FAMILY MEDICINE | Facility: CLINIC | Age: 63
End: 2023-04-14
Payer: COMMERCIAL

## 2023-04-14 DIAGNOSIS — T21.22XA PARTIAL THICKNESS BURN OF ABDOMEN, INITIAL ENCOUNTER: Primary | ICD-10-CM

## 2023-04-14 PROCEDURE — 99421 OL DIG E/M SVC 5-10 MIN: CPT | Performed by: FAMILY MEDICINE

## 2023-04-20 ENCOUNTER — PATIENT OUTREACH (OUTPATIENT)
Dept: CARE COORDINATION | Facility: CLINIC | Age: 63
End: 2023-04-20
Payer: COMMERCIAL

## 2023-04-30 ENCOUNTER — HEALTH MAINTENANCE LETTER (OUTPATIENT)
Age: 63
End: 2023-04-30

## 2023-05-04 ENCOUNTER — PATIENT OUTREACH (OUTPATIENT)
Dept: FAMILY MEDICINE | Facility: CLINIC | Age: 63
End: 2023-05-04
Payer: COMMERCIAL

## 2023-06-07 ENCOUNTER — HOSPITAL ENCOUNTER (OUTPATIENT)
Dept: MAMMOGRAPHY | Facility: CLINIC | Age: 63
Discharge: HOME | End: 2023-06-07
Attending: FAMILY MEDICINE | Admitting: FAMILY MEDICINE
Payer: COMMERCIAL

## 2023-06-07 DIAGNOSIS — Z12.31 VISIT FOR SCREENING MAMMOGRAM: ICD-10-CM

## 2023-06-07 PROCEDURE — 77067 SCR MAMMO BI INCL CAD: CPT

## 2023-11-16 ENCOUNTER — TELEPHONE (OUTPATIENT)
Dept: FAMILY MEDICINE | Facility: CLINIC | Age: 63
End: 2023-11-16

## 2023-11-16 NOTE — TELEPHONE ENCOUNTER
"See message below, from \"CRM Request\" folder:    Ivania Antonio  P [facility] Reception Pool8 hours ago (8:22 AM)     Topic: Non-Medical Question.     Just wanted to say if there is any cancellations with Dr Owen prior to my 1-5-24 appointment, I would appreciate getting in sooner if at all possible for insurance reasons.    Peri Araiza RN  [facility]    "

## 2024-02-22 SDOH — HEALTH STABILITY: PHYSICAL HEALTH: ON AVERAGE, HOW MANY MINUTES DO YOU ENGAGE IN EXERCISE AT THIS LEVEL?: 30 MIN

## 2024-02-22 SDOH — HEALTH STABILITY: PHYSICAL HEALTH: ON AVERAGE, HOW MANY DAYS PER WEEK DO YOU ENGAGE IN MODERATE TO STRENUOUS EXERCISE (LIKE A BRISK WALK)?: 2 DAYS

## 2024-02-22 ASSESSMENT — SOCIAL DETERMINANTS OF HEALTH (SDOH): HOW OFTEN DO YOU GET TOGETHER WITH FRIENDS OR RELATIVES?: THREE TIMES A WEEK

## 2024-02-27 ENCOUNTER — MYC MEDICAL ADVICE (OUTPATIENT)
Dept: FAMILY MEDICINE | Facility: CLINIC | Age: 64
End: 2024-02-27

## 2024-02-27 ENCOUNTER — OFFICE VISIT (OUTPATIENT)
Dept: FAMILY MEDICINE | Facility: CLINIC | Age: 64
End: 2024-02-27
Payer: COMMERCIAL

## 2024-02-27 VITALS
DIASTOLIC BLOOD PRESSURE: 92 MMHG | OXYGEN SATURATION: 94 % | WEIGHT: 207.6 LBS | HEIGHT: 63 IN | TEMPERATURE: 98.1 F | HEART RATE: 101 BPM | RESPIRATION RATE: 20 BRPM | BODY MASS INDEX: 36.79 KG/M2 | SYSTOLIC BLOOD PRESSURE: 142 MMHG

## 2024-02-27 DIAGNOSIS — Z12.11 SCREEN FOR COLON CANCER: ICD-10-CM

## 2024-02-27 DIAGNOSIS — Z12.4 CERVICAL CANCER SCREENING: ICD-10-CM

## 2024-02-27 DIAGNOSIS — Z13.220 LIPID SCREENING: ICD-10-CM

## 2024-02-27 DIAGNOSIS — Z01.10 ENCOUNTER FOR HEARING SCREENING WITHOUT ABNORMAL FINDINGS: Primary | ICD-10-CM

## 2024-02-27 DIAGNOSIS — Z00.00 ROUTINE GENERAL MEDICAL EXAMINATION AT A HEALTH CARE FACILITY: Primary | ICD-10-CM

## 2024-02-27 DIAGNOSIS — R03.0 ELEVATED BLOOD PRESSURE READING WITHOUT DIAGNOSIS OF HYPERTENSION: ICD-10-CM

## 2024-02-27 LAB
ANION GAP SERPL CALCULATED.3IONS-SCNC: 11 MMOL/L (ref 7–15)
BUN SERPL-MCNC: 12.1 MG/DL (ref 8–23)
CALCIUM SERPL-MCNC: 9.7 MG/DL (ref 8.8–10.2)
CHLORIDE SERPL-SCNC: 105 MMOL/L (ref 98–107)
CHOLEST SERPL-MCNC: 178 MG/DL
CREAT SERPL-MCNC: 0.99 MG/DL (ref 0.51–0.95)
DEPRECATED HCO3 PLAS-SCNC: 23 MMOL/L (ref 22–29)
EGFRCR SERPLBLD CKD-EPI 2021: 64 ML/MIN/1.73M2
FASTING STATUS PATIENT QL REPORTED: YES
GLUCOSE SERPL-MCNC: 123 MG/DL (ref 70–99)
HDLC SERPL-MCNC: 60 MG/DL
LDLC SERPL CALC-MCNC: 100 MG/DL
NONHDLC SERPL-MCNC: 118 MG/DL
POTASSIUM SERPL-SCNC: 4 MMOL/L (ref 3.4–5.3)
SODIUM SERPL-SCNC: 139 MMOL/L (ref 135–145)
TRIGL SERPL-MCNC: 91 MG/DL

## 2024-02-27 PROCEDURE — 99213 OFFICE O/P EST LOW 20 MIN: CPT | Mod: 25 | Performed by: STUDENT IN AN ORGANIZED HEALTH CARE EDUCATION/TRAINING PROGRAM

## 2024-02-27 PROCEDURE — 87624 HPV HI-RISK TYP POOLED RSLT: CPT | Performed by: STUDENT IN AN ORGANIZED HEALTH CARE EDUCATION/TRAINING PROGRAM

## 2024-02-27 PROCEDURE — G0145 SCR C/V CYTO,THINLAYER,RESCR: HCPCS | Performed by: STUDENT IN AN ORGANIZED HEALTH CARE EDUCATION/TRAINING PROGRAM

## 2024-02-27 PROCEDURE — 80061 LIPID PANEL: CPT | Performed by: STUDENT IN AN ORGANIZED HEALTH CARE EDUCATION/TRAINING PROGRAM

## 2024-02-27 PROCEDURE — 80048 BASIC METABOLIC PNL TOTAL CA: CPT | Performed by: STUDENT IN AN ORGANIZED HEALTH CARE EDUCATION/TRAINING PROGRAM

## 2024-02-27 PROCEDURE — 36415 COLL VENOUS BLD VENIPUNCTURE: CPT | Performed by: STUDENT IN AN ORGANIZED HEALTH CARE EDUCATION/TRAINING PROGRAM

## 2024-02-27 PROCEDURE — 99396 PREV VISIT EST AGE 40-64: CPT | Performed by: STUDENT IN AN ORGANIZED HEALTH CARE EDUCATION/TRAINING PROGRAM

## 2024-02-27 ASSESSMENT — PAIN SCALES - GENERAL: PAINLEVEL: NO PAIN (0)

## 2024-02-27 NOTE — RESULT ENCOUNTER NOTE
Deanna Antonio,     It is a pleasure providing you with medical care. I have received and reviewed your results, and have the following recommendations:     Good news, your lipid panel indicates that you do not have any elevated cholesterol values.    Unfortunately, based on the results of your basic metabolic panel you do have an elevated glucose level of 123.  A diagnosis of diabetes is made when a fasting glucose level is 126 or higher.  You are not at the value of being diagnosed with diabetes, however you are diagnosed with prediabetes.  At this time no medication changes are needed but it is very important that you take the following actions to prevent progression of prediabetes to diabetes:     Try to limit your dietary intake of carbohydrate rich foods like rice, pasta, breads, potatoes, sweets. Additionally, limit your intake of drinks with added sugars, such as juice, regular soda, and regular sports or energy drinks.  It is ok for you to have lean meats, chicken, turkey, fish, eggs, nuts, beans, lentils, and tofu. With regards to exercise, try to get at least 30 minutes of CONTINUOUS aerobic exercise at least 3 times per week.     Please make a follow-up appointment with me in 3 months to reassess your levels after incorporating the following lifestyle changes mentioned above.  If needed, we may have to consider ordering an A1c to further assess you for diabetes.    Sincerely,     Amirah Davis MD

## 2024-02-27 NOTE — PATIENT INSTRUCTIONS
Preventive Care Advice   This is general advice given by our system to help you stay healthy. However, your care team may have specific advice just for you. Please talk to your care team about your preventive care needs.  Nutrition  Eat 5 or more servings of fruits and vegetables each day.  Try wheat bread, brown rice and whole grain pasta (instead of white bread, rice, and pasta).  Get enough calcium and vitamin D. Check the label on foods and aim for 100% of the RDA (recommended daily allowance).  Lifestyle  Exercise at least 150 minutes each week   (30 minutes a day, 5 days a week).  Do muscle strengthening activities 2 days a week. These help control your weight and prevent disease.  No smoking.  Wear sunscreen to prevent skin cancer.  Have a dental exam and cleaning every 6 months.  Yearly exams  See your health care team every year to talk about:  Any changes in your health.  Any medicines your care team has prescribed.  Preventive care, family planning, and ways to prevent chronic diseases.  Shots (vaccines)   HPV shots (up to age 26), if you've never had them before.  Hepatitis B shots (up to age 59), if you've never had them before.  COVID-19 shot: Get this shot when it's due.  Flu shot: Get a flu shot every year.  Tetanus shot: Get a tetanus shot every 10 years.  Pneumococcal, hepatitis A, and RSV shots: Ask your care team if you need these based on your risk.  Shingles shot (for age 50 and up).  General health tests  Diabetes screening:  Starting at age 35, Get screened for diabetes at least every 3 years.  If you are younger than age 35, ask your care team if you should be screened for diabetes.  Cholesterol test: At age 39, start having a cholesterol test every 5 years, or more often if advised.  Bone density scan (DEXA): At age 50, ask your care team if you should have this scan for osteoporosis (brittle bones).  Hepatitis C: Get tested at least once in your life.  STIs (sexually transmitted  infections)  Before age 24: Ask your care team if you should be screened for STIs.  After age 24: Get screened for STIs if you're at risk. You are at risk for STIs (including HIV) if:  You are sexually active with more than one person.  You don't use condoms every time.  You or a partner was diagnosed with a sexually transmitted infection.  If you are at risk for HIV, ask about PrEP medicine to prevent HIV.  Get tested for HIV at least once in your life, whether you are at risk for HIV or not.  Cancer screening tests  Cervical cancer screening: If you have a cervix, begin getting regular cervical cancer screening tests at age 21. Most people who have regular screenings with normal results can stop after age 65. Talk about this with your provider.  Breast cancer scan (mammogram): If you've ever had breasts, begin having regular mammograms starting at age 40. This is a scan to check for breast cancer.  Colon cancer screening: It is important to start screening for colon cancer at age 45.  Have a colonoscopy test every 10 years (or more often if you're at risk) Or, ask your provider about stool tests like a FIT test every year or Cologuard test every 3 years.  To learn more about your testing options, visit: https://[email or web address]/915258.pdf.  For help making a decision, visit: https://bit.ly/eb47559.  Prostate cancer screening test: If you have a prostate and are age 55 to 69, ask your provider if you would benefit from a yearly prostate cancer screening test.  Lung cancer screening: If you are a current or former smoker age 50 to 80, ask your care team if ongoing lung cancer screenings are right for you.  For informational purposes only. Not to replace the advice of your health care provider. Copyright   2023 [employer]. All rights reserved. Clinically reviewed by the [facility]. [employer] [id number] - REV 01/24.    Learning About Stress  What is stress?     Stress is your  body's response to a hard situation. Your body can have a physical, emotional, or mental response. Stress is a fact of life for most people, and it affects everyone differently. What causes stress for you may not be stressful for someone else.  A lot of things can cause stress. You may feel stress when you go on a job interview, take a test, or run a race. This kind of short-term stress is normal and even useful. It can help you if you need to work hard or react quickly. For example, stress can help you finish an important job on time.  Long-term stress is caused by ongoing stressful situations or events. Examples of long-term stress include long-term health problems, ongoing problems at work, or conflicts in your family. Long-term stress can harm your health.  How does stress affect your health?  When you are stressed, your body responds as though you are in danger. It makes hormones that speed up your heart, make you breathe faster, and give you a burst of energy. This is called the fight-or-flight stress response. If the stress is over quickly, your body goes back to normal and no harm is done.  But if stress happens too often or lasts too long, it can have bad effects. Long-term stress can make you more likely to get sick, and it can make symptoms of some diseases worse. If you tense up when you are stressed, you may develop neck, shoulder, or low back pain. Stress is linked to high blood pressure and heart disease.  Stress also harms your emotional health. It can make you pop, tense, or depressed. Your relationships may suffer, and you may not do well at work or school.  What can you do to manage stress?  You can try these things to help manage stress:   Do something active. Exercise or activity can help reduce stress. Walking is a great way to get started. Even everyday activities such as housecleaning or yard work can help.  Try yoga or gaby chi. These techniques combine exercise and meditation. You may need  some training at first to learn them.  Do something you enjoy. For example, listen to music or go to a movie. Practice your hobby or do volunteer work.  Meditate. This can help you relax, because you are not worrying about what happened before or what may happen in the future.  Do guided imagery. Imagine yourself in any setting that helps you feel calm. You can use online videos, books, or a teacher to guide you.  Do breathing exercises. For example:  From a standing position, bend forward from the waist with your knees slightly bent. Let your arms dangle close to the floor.  Breathe in slowly and deeply as you return to a standing position. Roll up slowly and lift your head last.  Hold your breath for just a few seconds in the standing position.  Breathe out slowly and bend forward from the waist.  Let your feelings out. Talk, laugh, cry, and express anger when you need to. Talking with supportive friends or family, a counselor, or a joseline leader about your feelings is a healthy way to relieve stress. Avoid discussing your feelings with people who make you feel worse.  Write. It may help to write about things that are bothering you. This helps you find out how much stress you feel and what is causing it. When you know this, you can find better ways to cope.  What can you do to prevent stress?  You might try some of these things to help prevent stress:  Manage your time. This helps you find time to do the things you want and need to do.  Get enough sleep. Your body recovers from the stresses of the day while you are sleeping.  Get support. Your family, friends, and community can make a difference in how you experience stress.  Limit your news feed. Avoid or limit time on social media or news that may make you feel stressed.  Do something active. Exercise or activity can help reduce stress. Walking is a great way to get started.  Where can you learn more?  Go to https://www.healthwise.net/patiented  Enter N032 in the  "search box to learn more about \"Learning About Stress.\"  Current as of: February 26, 2023               Content Version: 13.8    6398-5674 [employer].   Care instructions adapted under license by your healthcare professional. If you have questions about a medical condition or this instruction, always ask your healthcare professional. [employer] disclaims any warranty or liability for your use of this information.      "

## 2024-02-27 NOTE — PROGRESS NOTES
"Preventive Care Visit  [facility]  BREEZY LOBO MD, Family Medicine  Feb 27, 2024    Assessment & Plan     Routine general medical examination at a health care facility  - Basic metabolic panel  (Ca, Cl, CO2, Creat, Gluc, K, Na, BUN); Future    Lipid screening  - Lipid panel reflex to direct LDL Fasting; Future    Cervical cancer screening  > Pap in 2022 was positive for high risk HPV patient underwent LEEP which showed no evidence of dysplasia or malignancy   - Pap Screen with HPV - recommended age 30 - 65 years    Screen for colon cancer  - Fecal colorectal cancer screen FIT - Future (S+30); Future    Elevated blood pressure without diagnosis of hypertension   > patient has blood pressure cuff at home and will be checking her blood pressure at home    - will have patient come to clinic in 2 weeks for nurse blood pressure check if blood pressure is still elevated may need to start antihypertensive     BMI  Estimated body mass index is 36.33 kg/m  as calculated from the following:    Height as of this encounter: 1.61 m (5' 3.39\").    Weight as of this encounter: 94.2 kg (207 lb 9.6 oz).       Counseling  Appropriate preventive services were discussed with this patient, including applicable screening as appropriate for fall prevention, nutrition, physical activity, Tobacco-use cessation, weight loss and cognition.  Checklist reviewing preventive services available has been given to the patient.  Reviewed patient's diet, addressing concerns and/or questions.   She is at risk for lack of exercise and has been provided with information to increase physical activity for the benefit of her well-being.   She is at risk for psychosocial distress and has been provided with information to reduce risk.       Nora Redd is a 63 year old, presenting for the following:  Physical and Health Maintenance (Declines vaccinations. Would like to do a mail in colon screening kit.)        2/27/2024     " 8:03 AM   Additional Questions   Roomed by Ailyn BOSTON LPN   Accompanied by self         2/27/2024     8:03 AM   Patient Reported Additional Medications   Patient reports taking the following new medications no new meds        Health Care Directive  Patient does not have a Health Care Directive or Living Will: Discussed advance care planning with patient; information given to patient to review.    HPI    Doing well, will be planning to retire in the next 2 months   Has grandchildren   Is a little anxious today because of needing a pap with her previous abnormal results         2/22/2024   General Health   How would you rate your overall physical health? Good   Feel stress (tense, anxious, or unable to sleep) Only a little   (!) STRESS CONCERN      2/22/2024   Nutrition   Three or more servings of calcium each day? (!) NO   Diet: Regular (no restrictions)   How many servings of fruit and vegetables per day? (!) 0-1   How many sweetened beverages each day? (!) 2         2/22/2024   Exercise   Days per week of moderate/strenous exercise 2 days   Average minutes spent exercising at this level 30 min   (!) EXERCISE CONCERN      2/22/2024   Social Factors   Frequency of gathering with friends or relatives Three times a week   Worry food won't last until get money to buy more No   Food not last or not have enough money for food? No   Do you have housing?  Yes   Are you worried about losing your housing? No   Lack of transportation? No   Unable to get utilities (heat,electricity)? No         2/22/2024   Fall Risk   Fallen 2 or more times in the past year? No   Trouble with walking or balance? No          2/22/2024   Dental   Dentist two times every year? Yes         2/22/2024   TB Screening   Were you born outside of [place]?  No     Today's PHQ-2 Score:       2/27/2024     8:08 AM   PHQ-2 ( 1999 [employer])   Q1: Little interest or pleasure in doing things 0   Q2: Feeling down, depressed or hopeless 0   PHQ-2 Score 0          2/22/2024   Substance Use   Alcohol more than 3/day or more than 7/wk No   Do you use any other substances recreationally? No     Social History     Tobacco Use    Smoking status: Never    Smokeless tobacco: Never   Vaping Use    Vaping Use: Never used   Substance Use Topics    Alcohol use: Yes     Comment: ocass. 4 beers a month    Drug use: No             2/22/2024   Breast Cancer Screening   Family history of breast, colon, or ovarian cancer? No / Unknown          No data to display                 Has mammogram scheduled in 2 months           2/22/2024   One time HIV Screening   Previous HIV test? No         2/22/2024   STI Screening   New sexual partner(s) since last STI/HIV test? No     History of abnormal Pap smear:         Latest Ref Rng & Units 3/30/2022     7:21 AM 2/12/2021     7:50 AM 2/12/2021     7:36 AM   PAP / HPV   PAP  Negative for Intraepithelial Lesion or Malignancy (NILM)      PAP (Historical)    NIL    HPV 16 DNA Negative Negative  Negative     HPV 18 DNA Negative Negative  Negative     Other HR HPV Negative Positive  Positive       ASCVD Risk   The 10-year ASCVD risk score (Vanessa ROLDAN, et al., 2019) is: 4.4%    Values used to calculate the score:      Age: 63 years      Sex: Female      Is Non- : No      Diabetic: No      Tobacco smoker: No      Systolic Blood Pressure: 142 mmHg      Is BP treated: No      HDL Cholesterol: 61 mg/dL      Total Cholesterol: 184 mg/dL         Reviewed and updated as needed this visit by Provider                    Review of Systems   Constitutional:  Negative for chills and fever.   HENT:  Negative for ear pain.    Eyes:  Negative for pain.   Respiratory:  Negative for cough.    Cardiovascular:  Negative for chest pain.   Gastrointestinal:  Negative for abdominal pain.   Genitourinary:  Negative for dysuria.   Musculoskeletal:  Negative for neck pain.   Skin:  Negative for rash.   Neurological:  Negative for headaches.         "  Objective    Exam  BP (!) 142/92 (BP Location: Left arm, Patient Position: Sitting, Cuff Size: Adult Regular)   Pulse 101   Temp 98.1  F (36.7  C) (Tympanic)   Resp 20   Ht 1.61 m (5' 3.39\")   Wt 94.2 kg (207 lb 9.6 oz)   LMP 10/01/2012   SpO2 94%   BMI 36.33 kg/m     Estimated body mass index is 36.33 kg/m  as calculated from the following:    Height as of this encounter: 1.61 m (5' 3.39\").    Weight as of this encounter: 94.2 kg (207 lb 9.6 oz).    Physical Exam  Constitutional:       General: She is not in acute distress.  HENT:      Head: Normocephalic and atraumatic.      Right Ear: External ear normal.      Left Ear: External ear normal.      Mouth/Throat:      Mouth: Mucous membranes are moist.      Pharynx: Oropharynx is clear. No oropharyngeal exudate or posterior oropharyngeal erythema.   Eyes:      Extraocular Movements: Extraocular movements intact.   Cardiovascular:      Rate and Rhythm: Normal rate and regular rhythm.      Heart sounds: Normal heart sounds.   Pulmonary:      Effort: Pulmonary effort is normal. No respiratory distress.      Breath sounds: Normal breath sounds. No wheezing or rhonchi.   Abdominal:      Palpations: Abdomen is soft. There is no mass.      Tenderness: There is no abdominal tenderness.   Genitourinary:     General: Normal vulva.      Exam position: Lithotomy position.      Labia:         Right: No rash, tenderness, lesion or injury.         Left: No rash, tenderness, lesion or injury.       Vagina: Normal.      Cervix: Normal.   Musculoskeletal:         General: No deformity. Normal range of motion.      Cervical back: Normal range of motion and neck supple.   Skin:     General: Skin is warm.      Findings: No rash.   Neurological:      General: No focal deficit present.      Mental Status: She is alert and oriented to person, place, and time.   Psychiatric:         Mood and Affect: Mood normal.         Signed Electronically by: BREEZY LOBO MD    "

## 2024-03-02 PROCEDURE — 82274 ASSAY TEST FOR BLOOD FECAL: CPT

## 2024-03-04 ENCOUNTER — OFFICE VISIT (OUTPATIENT)
Dept: AUDIOLOGY | Facility: CLINIC | Age: 64
End: 2024-03-04
Attending: STUDENT IN AN ORGANIZED HEALTH CARE EDUCATION/TRAINING PROGRAM
Payer: COMMERCIAL

## 2024-03-04 ENCOUNTER — PATIENT OUTREACH (OUTPATIENT)
Dept: FAMILY MEDICINE | Facility: CLINIC | Age: 64
End: 2024-03-04

## 2024-03-04 DIAGNOSIS — R87.810 CERVICAL HIGH RISK HPV (HUMAN PAPILLOMAVIRUS) TEST POSITIVE: Primary | ICD-10-CM

## 2024-03-04 DIAGNOSIS — Z01.10 ENCOUNTER FOR HEARING SCREENING WITHOUT ABNORMAL FINDINGS: ICD-10-CM

## 2024-03-04 PROCEDURE — 92550 TYMPANOMETRY & REFLEX THRESH: CPT | Performed by: AUDIOLOGIST

## 2024-03-04 PROCEDURE — 92557 COMPREHENSIVE HEARING TEST: CPT | Performed by: AUDIOLOGIST

## 2024-03-04 NOTE — PROGRESS NOTES
AUDIOLOGY REPORT    SUBJECTIVE:  Ivania Antonio is a 63 year old female who was seen in the Audiology Clinic at the [facility] for audiologic evaluation, referred by Amirah Davis M.D. .No previous audiograms are available at today's appointment.  The patient reports difficulty hearing her spouse. The patient denies  bilateral tinnitus, bilateral otalgia, and family history of hearing loss.  The patient notes very little  difficulty with communication in a variety of listening situations.  They were accompanied today by their self.    OBJECTIVE:  Abuse Screening:  Do you feel unsafe at home or work/school? No  Do you feel threatened by someone? No  Does anyone try to keep you from having contact with others, or doing things outside of your home? No  Physical signs of abuse present? No     Fall Risk Screen:  1. Have you fallen two or more times in the past year? No  2. Have you fallen and had an injury in the past year? No      Otoscopic exam indicates ears are clear of cerumen bilaterally     Pure Tone Thresholds assessed using conventional audiometry with good  reliability from 250-8000 Hz bilaterally using insert earphones and circumaural headphones     RIGHT:  normal sloping to mild and moderate sensorineural hearing loss    LEFT:    normal sloping to mild and moderate-severe sensorineural hearing loss    Tympanogram:    RIGHT: normal eardrum mobility    LEFT:   normal eardrum mobility    Reflexes (reported by stimulus ear):  RIGHT: Ipsilateral is present at normal levels  RIGHT: Contralateral is present at normal levels  LEFT:   Ipsilateral is present at normal levels  LEFT:   Contralateral is present at normal levels      Speech Reception Threshold:    RIGHT: 10 dB HL    LEFT:   20 dB HL  Word Recognition Score:     RIGHT: 100% at 50 dB HL using NU-6 recorded word list.    LEFT:   96% at 60 dB HL using NU-6 recorded word list.      ASSESSMENT:     ICD-10-CM    1. Encounter for hearing  screening without abnormal findings  Z01.10 Adult Audiology Pending sale to [facility] Referral          Today s results were discussed with the patient in detail.     PLAN:  Patient was counseled regarding hearing loss and impact on communication.  Ivania is not interested in amplification at this time.  It is recommended that the patient return in 6 months for follow up hearing evaluation.  Please call this clinic with questions regarding these results or recommendations.        Elicia Kirkpatrick M.A. -[facility], #[id number]

## 2024-03-05 ENCOUNTER — TELEPHONE (OUTPATIENT)
Dept: OBGYN | Facility: CLINIC | Age: 64
End: 2024-03-05
Payer: COMMERCIAL

## 2024-03-05 DIAGNOSIS — Z12.11 SCREEN FOR COLON CANCER: ICD-10-CM

## 2024-03-05 LAB — HEMOCCULT STL QL IA: NEGATIVE

## 2024-03-05 NOTE — TELEPHONE ENCOUNTER
[facility] Call Center    Phone Message    May a detailed message be left on voicemail: yes     Reason for Call: Other: Pt would like to schedule a colposcopy. Please call to discuss.      Action Taken: Other: OBGYN    Travel Screening: Not Applicable

## 2024-03-14 ENCOUNTER — OFFICE VISIT (OUTPATIENT)
Dept: OBGYN | Facility: CLINIC | Age: 64
End: 2024-03-14
Payer: COMMERCIAL

## 2024-03-14 VITALS
SYSTOLIC BLOOD PRESSURE: 161 MMHG | HEART RATE: 94 BPM | DIASTOLIC BLOOD PRESSURE: 106 MMHG | BODY MASS INDEX: 34.65 KG/M2 | TEMPERATURE: 97.6 F | WEIGHT: 198 LBS

## 2024-03-14 DIAGNOSIS — R87.810 CERVICAL HIGH RISK HPV (HUMAN PAPILLOMAVIRUS) TEST POSITIVE: Primary | ICD-10-CM

## 2024-03-14 PROCEDURE — 99213 OFFICE O/P EST LOW 20 MIN: CPT | Mod: 25 | Performed by: OBSTETRICS & GYNECOLOGY

## 2024-03-14 PROCEDURE — 57454 BX/CURETT OF CERVIX W/SCOPE: CPT | Performed by: OBSTETRICS & GYNECOLOGY

## 2024-03-14 PROCEDURE — 88305 TISSUE EXAM BY PATHOLOGIST: CPT | Performed by: PATHOLOGY

## 2024-03-14 NOTE — NURSING NOTE
"Initial BP (!) 161/106 (BP Location: Left arm, Patient Position: Chair, Cuff Size: Adult Large)   Pulse 94   Temp 97.6  F (36.4  C) (Tympanic)   Wt 89.8 kg (198 lb)   LMP 10/01/2012   BMI 34.65 kg/m   Estimated body mass index is 34.65 kg/m  as calculated from the following:    Height as of 2/27/24: 1.61 m (5' 3.39\").    Weight as of this encounter: 89.8 kg (198 lb). .      Latesha Peres MA    "

## 2024-03-19 ENCOUNTER — TELEPHONE (OUTPATIENT)
Dept: FAMILY MEDICINE | Facility: CLINIC | Age: 64
End: 2024-03-19
Payer: COMMERCIAL

## 2024-03-19 ENCOUNTER — PREP FOR PROCEDURE (OUTPATIENT)
Dept: OBGYN | Facility: CLINIC | Age: 64
End: 2024-03-19
Payer: COMMERCIAL

## 2024-03-19 DIAGNOSIS — N87.9 CERVICAL DYSPLASIA: Primary | ICD-10-CM

## 2024-03-19 NOTE — TELEPHONE ENCOUNTER
Patient Quality Outreach    Patient is due for the following:   Hypertension -  BP check    Next Steps:   Schedule a nurse only visit for BP recheck    Type of outreach:    Phone, left message for patient/parent to call back.      Questions for provider review:    None           Ailyn BOSTON LPN

## 2024-03-19 NOTE — TELEPHONE ENCOUNTER
Left message for patient to return call to clinic to get RN BP check scheduled. Patient already completed the hearing evaluation.    Ailyn BOSTON LPN on 3/19/2024 at 4:03 PM

## 2024-03-21 NOTE — TELEPHONE ENCOUNTER
Patient Quality Outreach    Patient is due for the following:   Hypertension -  BP check    Next Steps:   Patient was scheduled for RN BP recheck  for 3/27/24 at 1530.    Type of outreach:    Phone, spoke to patient/parent. Scheduled appointment      Questions for provider review:    None           Ailyn BOSTON LPN

## 2024-03-25 NOTE — PATIENT INSTRUCTIONS
Thank you for choosing us for your care. Based on your symptoms and length of illness, I do not think that you need a prescription at this time. Please just use over the counter vaseline or aquaphor to protect the skin 2-3 times a day.  Stop neosporin as this can sometimes irritate the skin more.  It looks like it is starting to heal well.      Please follow the care advise I've provided and use the over the counter medications to help relieve your symptoms.   View your full visit summary for details by clicking on the link below.     If you're not feeling better within 2-3 days, please respond to this message and we can consider if a prescription is needed.  You can schedule an appointment right here in [facility], or call [phone]  If the visit is for the same symptoms as your eVisit, we'll refund the cost of your eVisit if seen within seven days.       Health Maintenance Due   Topic Date Due    Shingles Vaccine (1 of 2) Never done    Influenza Vaccine (1) Never done    COVID-19 Vaccine (5 - 2023-24 season) 09/01/2023    Lung Cancer Screening  11/11/2023       Patient is due for topics as listed above but is not proceeding with Immunization(s) COVID-19, Influenza, and Shingles and Lung Cancer Screening at this time.       The recording was initiated after a verbal consent was obtained from the patient to record this visit for documentation in their clinical record.

## 2024-03-27 ENCOUNTER — ALLIED HEALTH/NURSE VISIT (OUTPATIENT)
Dept: FAMILY MEDICINE | Facility: CLINIC | Age: 64
End: 2024-03-27
Payer: COMMERCIAL

## 2024-03-27 ENCOUNTER — TELEPHONE (OUTPATIENT)
Dept: FAMILY MEDICINE | Facility: CLINIC | Age: 64
End: 2024-03-27

## 2024-03-27 VITALS
SYSTOLIC BLOOD PRESSURE: 146 MMHG | RESPIRATION RATE: 16 BRPM | OXYGEN SATURATION: 93 % | HEART RATE: 87 BPM | DIASTOLIC BLOOD PRESSURE: 92 MMHG

## 2024-03-27 DIAGNOSIS — Z01.30 BLOOD PRESSURE CHECK: Primary | ICD-10-CM

## 2024-03-27 PROCEDURE — 99207 PR NO CHARGE NURSE ONLY: CPT

## 2024-03-27 NOTE — PROGRESS NOTES
Ivania Antonio is a 63 year old year old patient who comes in today for a Blood Pressure check because of ongoing blood pressure monitoring.  OV 2/27: elevated BP without dx of HTN. /92  Since this visit, pt has lost 12 lbs, has made lifestyle changes: eating more proteins, fruits/veg & healthy fats. Staying away from carbs/pasta. Pt also exercising.  *recent home BP's have been 130-140's/90's. Last night /97.  *pt is not on BP meds.    Vital Signs as repeated by RN   152/94 p87  146/92 p87    Patient is monitoring Blood Pressure at home.    Current complaints: none  Disposition:  routing to provider for review.    Kenya Winn RN

## 2024-03-27 NOTE — TELEPHONE ENCOUNTER
Ivania Trivedi is a 63 year old year old patient who comes in today for a Blood Pressure check because of ongoing blood pressure monitoring.  OV 2/27: elevated BP without dx of HTN. /92  Since this visit, pt has lost 12 lbs, has made lifestyle changes: eating more proteins, fruits/veg & healthy fats. Staying away from carbs/pasta. Pt also exercising.  *recent home BP's have been 130-140's/90's. Last night /97.  *pt is not on BP meds.     Vital Signs as repeated by RN   152/94 p87  146/92 p87    Patient is monitoring Blood Pressure at home.    Current complaints: none  Disposition:  routing to provider for review.     Kenya Winn RN

## 2024-03-27 NOTE — TELEPHONE ENCOUNTER
Good for you for making these changes! They are so good for your health.  BPs are still out of goal.  Do Schedule clinic appt to start blood pressure lowering medication.  Thank you,  Ramiro Owen MD

## 2024-03-28 NOTE — TELEPHONE ENCOUNTER
Pt in good understanding. No appts with PCP until June could pt be seen sooner by provider.     .Nikole Ramirez PSC

## 2024-03-29 NOTE — TELEPHONE ENCOUNTER
Left message for patient to return call to clinic.   *use same day appt within next 1-2 weeks to see PCP    Kenya Winn RN

## 2024-04-01 ENCOUNTER — MYC MEDICAL ADVICE (OUTPATIENT)
Dept: FAMILY MEDICINE | Facility: CLINIC | Age: 64
End: 2024-04-01

## 2024-04-02 ENCOUNTER — TELEPHONE (OUTPATIENT)
Dept: OBGYN | Facility: CLINIC | Age: 64
End: 2024-04-02

## 2024-04-02 ENCOUNTER — OFFICE VISIT (OUTPATIENT)
Dept: FAMILY MEDICINE | Facility: CLINIC | Age: 64
End: 2024-04-02
Payer: COMMERCIAL

## 2024-04-02 VITALS
TEMPERATURE: 96.9 F | RESPIRATION RATE: 20 BRPM | HEART RATE: 90 BPM | SYSTOLIC BLOOD PRESSURE: 140 MMHG | DIASTOLIC BLOOD PRESSURE: 92 MMHG | OXYGEN SATURATION: 98 % | HEIGHT: 63 IN | BODY MASS INDEX: 35.51 KG/M2 | WEIGHT: 200.4 LBS

## 2024-04-02 DIAGNOSIS — E66.812 CLASS 2 SEVERE OBESITY DUE TO EXCESS CALORIES WITH SERIOUS COMORBIDITY AND BODY MASS INDEX (BMI) OF 35.0 TO 35.9 IN ADULT (H): ICD-10-CM

## 2024-04-02 DIAGNOSIS — I10 BENIGN ESSENTIAL HYPERTENSION: Primary | ICD-10-CM

## 2024-04-02 DIAGNOSIS — E66.01 CLASS 2 SEVERE OBESITY DUE TO EXCESS CALORIES WITH SERIOUS COMORBIDITY AND BODY MASS INDEX (BMI) OF 35.0 TO 35.9 IN ADULT (H): ICD-10-CM

## 2024-04-02 PROBLEM — R03.0 ELEVATED BLOOD PRESSURE READING WITHOUT DIAGNOSIS OF HYPERTENSION: Status: RESOLVED | Noted: 2022-04-28 | Resolved: 2024-04-02

## 2024-04-02 PROCEDURE — 99214 OFFICE O/P EST MOD 30 MIN: CPT | Performed by: FAMILY MEDICINE

## 2024-04-02 PROCEDURE — G2211 COMPLEX E/M VISIT ADD ON: HCPCS | Performed by: FAMILY MEDICINE

## 2024-04-02 RX ORDER — LOSARTAN POTASSIUM 25 MG/1
25 TABLET ORAL DAILY
Qty: 90 TABLET | Refills: 3 | Status: SHIPPED | OUTPATIENT
Start: 2024-04-02

## 2024-04-02 ASSESSMENT — PAIN SCALES - GENERAL: PAINLEVEL: NO PAIN (0)

## 2024-04-02 NOTE — TELEPHONE ENCOUNTER
"[id number]  Ivania MELONIE Paco    You are now scheduled for surgery at [facility].  Below are the details for your surgery.  Please read the \"Preparing for Your Surgery\" instructions and let us know if you have any questions.    Type of surgery: Transvaginal hysterectomy, bilateral salpingo-oophorectomy, cystoscopy   Surgeon:  Mary Alice Muñoz MD  Location of surgery: [facility] OR    Date of surgery: 6/6/24    Time: 7:30 am   Arrival Time: 6:00 am    Time can change, to be confirmed a couple of days prior by pre-op surgery nurse.    Pre-Op Appt Date: Patient to schedule with a PCP or Family Practice Provider within 30 days to the surgery.  Post-Op Appt Date: 4 weeks; will schedule when July calendar comes out       Packet sent out: will pick-up from clinic  Pre-cert/Authorization completed:  TBD by Financial Securing Office.   MA Sterilization/Hysterectomy Acknowledgment Consent signed: N/A    [facility]  [phone]    Fax: [phone]  Same Day Surgery [phone]  Fax: [phone]  [facility] [phone]    "

## 2024-04-02 NOTE — PATIENT INSTRUCTIONS
Start losartan 25mg a day, works best if taken at bedtime.  Keep up the good work with healthy eating and exercise.      Goal BP <140/<90 always, ideal <130/<80  -Check blood pressures a few times per week and write them down and bring them with to next clinic visit.    -Schedule a nurse BP recheck and lab only appt in 3 weeks and bring you home cuff and numbers along to compare.  -Low salt diet 1500mg per day. DASH diet has sample menu plans.  -Labs once per year to check kidneys and electrolytes.  -Regular moderate intensity exercise.

## 2024-04-02 NOTE — PROGRESS NOTES
"  Assessment & Plan     Benign essential hypertension  New diagnosis  Not controlled  Start losartan  -discussed risks, benefits, and side effects of this new medication. Patient understands and is in agreement.  - losartan (COZAAR) 25 MG tablet; Take 1 tablet (25 mg) by mouth daily  - **Basic metabolic panel FUTURE 14d; Future    The longitudinal plan of care for the diagnosis(es)/condition(s) as documented were addressed during this visit. Due to the added complexity in care, I will continue to support Ivania in the subsequent management and with ongoing continuity of care.        BMI  Estimated body mass index is 35.29 kg/m  as calculated from the following:    Height as of this encounter: 1.605 m (5' 3.19\").    Weight as of this encounter: 90.9 kg (200 lb 6.4 oz).   Weight management plan: Discussed healthy diet and exercise guidelines      See Patient Instructions    Subjective   Ivania is a 63 year old, presenting for the following health issues:  Hypertension      4/2/2024    11:11 AM   Additional Questions   Roomed by Sury Shi   Accompanied by self         4/2/2024    11:11 AM   Patient Reported Additional Medications   Patient reports taking the following new medications none     History of Present Illness       Hypertension: She presents for follow up of hypertension.  She does check blood pressure  regularly outside of the clinic. Outside blood pressures have been over 140/90. She does not follow a low salt diet.     She eats 2-3 servings of fruits and vegetables daily.She consumes 0 sweetened beverage(s) daily.She exercises with enough effort to increase her heart rate 20 to 29 minutes per day.    She is taking medications regularly.     Did start walking.  Decreased carbs, increase veggies and fruits.  Lost 12 pounds.  Blood pressures are improving with weight loss.  BPs still in 140s.  Going to retire which will reduce stress.    BP Readings from Last 3 Encounters:   04/02/24 (!) 140/92 " "  03/27/24 (!) 146/92   03/14/24 (!) 161/106             Review of Systems  Constitutional, HEENT, cardiovascular, pulmonary, gi and gu systems are negative, except as otherwise noted.      Objective    BP (!) 140/92 (BP Location: Right arm, Patient Position: Sitting, Cuff Size: Adult Large)   Pulse 90   Temp 96.9  F (36.1  C) (Tympanic)   Resp 20   Ht 1.605 m (5' 3.19\")   Wt 90.9 kg (200 lb 6.4 oz)   LMP 10/01/2012   SpO2 98%   BMI 35.29 kg/m    Body mass index is 35.29 kg/m .  Physical Exam   GENERAL: alert and no distress  RESP: lungs clear to auscultation - no rales, rhonchi or wheezes  CV: regular rate and rhythm, normal S1 S2, no S3 or S4, no murmur, click or rub, no peripheral edema  MS: no gross musculoskeletal defects noted, no edema            Signed Electronically by: Ramiro Owen MD    "

## 2024-04-25 ENCOUNTER — MYC MEDICAL ADVICE (OUTPATIENT)
Dept: FAMILY MEDICINE | Facility: CLINIC | Age: 64
End: 2024-04-25
Payer: COMMERCIAL

## 2024-04-26 ENCOUNTER — HOSPITAL ENCOUNTER (OUTPATIENT)
Dept: MAMMOGRAPHY | Facility: CLINIC | Age: 64
Discharge: HOME | End: 2024-04-26
Attending: FAMILY MEDICINE | Admitting: FAMILY MEDICINE
Payer: COMMERCIAL

## 2024-04-26 DIAGNOSIS — Z12.31 VISIT FOR SCREENING MAMMOGRAM: ICD-10-CM

## 2024-04-26 PROCEDURE — 77063 BREAST TOMOSYNTHESIS BI: CPT

## 2024-04-29 ENCOUNTER — PATIENT OUTREACH (OUTPATIENT)
Dept: OBGYN | Facility: CLINIC | Age: 64
End: 2024-04-29
Payer: COMMERCIAL

## 2024-05-01 ENCOUNTER — TELEPHONE (OUTPATIENT)
Dept: AUDIOLOGY | Facility: CLINIC | Age: 64
End: 2024-05-01
Payer: COMMERCIAL

## 2024-05-01 NOTE — TELEPHONE ENCOUNTER
[facility] Call Center    Phone Message    May a detailed message be left on voicemail: yes     Reason for Call: Other: Per Marti from [facility] she is trying to help with the DOS 03/04/2024. Please call @ [phone] to discuss. Thank you     Action Taken: Message routed to:  [facility] ([facility]): AUDIO    Travel Screening: Not Applicable

## 2024-05-01 NOTE — TELEPHONE ENCOUNTER
We are sorting out the visit from 3/4/2024 and making sure the coding is matching the visit correctly.     -Hernan Bravo CCC-A

## 2024-05-03 ENCOUNTER — ALLIED HEALTH/NURSE VISIT (OUTPATIENT)
Dept: FAMILY MEDICINE | Facility: CLINIC | Age: 64
End: 2024-05-03
Payer: COMMERCIAL

## 2024-05-03 ENCOUNTER — TELEPHONE (OUTPATIENT)
Dept: FAMILY MEDICINE | Facility: CLINIC | Age: 64
End: 2024-05-03

## 2024-05-03 ENCOUNTER — LAB (OUTPATIENT)
Dept: LAB | Facility: CLINIC | Age: 64
End: 2024-05-03
Payer: COMMERCIAL

## 2024-05-03 VITALS
OXYGEN SATURATION: 93 % | SYSTOLIC BLOOD PRESSURE: 132 MMHG | HEART RATE: 88 BPM | RESPIRATION RATE: 16 BRPM | DIASTOLIC BLOOD PRESSURE: 86 MMHG

## 2024-05-03 DIAGNOSIS — I10 BENIGN ESSENTIAL HYPERTENSION: Primary | ICD-10-CM

## 2024-05-03 DIAGNOSIS — I10 BENIGN ESSENTIAL HYPERTENSION: ICD-10-CM

## 2024-05-03 LAB
ANION GAP SERPL CALCULATED.3IONS-SCNC: 11 MMOL/L (ref 7–15)
BUN SERPL-MCNC: 16.8 MG/DL (ref 8–23)
CALCIUM SERPL-MCNC: 9.3 MG/DL (ref 8.8–10.2)
CHLORIDE SERPL-SCNC: 107 MMOL/L (ref 98–107)
CREAT SERPL-MCNC: 1 MG/DL (ref 0.51–0.95)
DEPRECATED HCO3 PLAS-SCNC: 22 MMOL/L (ref 22–29)
EGFRCR SERPLBLD CKD-EPI 2021: 63 ML/MIN/1.73M2
GLUCOSE SERPL-MCNC: 129 MG/DL (ref 70–99)
POTASSIUM SERPL-SCNC: 4.3 MMOL/L (ref 3.4–5.3)
SODIUM SERPL-SCNC: 140 MMOL/L (ref 135–145)

## 2024-05-03 PROCEDURE — 80048 BASIC METABOLIC PNL TOTAL CA: CPT

## 2024-05-03 PROCEDURE — 36415 COLL VENOUS BLD VENIPUNCTURE: CPT

## 2024-05-03 PROCEDURE — 99207 PR NO CHARGE NURSE ONLY: CPT

## 2024-05-03 NOTE — TELEPHONE ENCOUNTER
BP improved in to better range.   I would like home BPs checks a few times a week to know that we are staying in goal.  Continue current dose for now. Notify me if any higher home BPs over 140/over 90.    Goal BP <140/<90 always, ideal <130/<80  -Low salt diet 1500mg per day. DASH diet has sample menu plans.  -Regular moderate intensity exercise.    Thank you,  Ramiro Owen MD

## 2024-05-03 NOTE — PROGRESS NOTES
Ivania Antonio is a 64 year old year old patient who comes in today for a Blood Pressure check because of new medication.  *OV 4/2: provider started Losartan 25mg  *pt had labs today    Vital Signs as repeated by RN   136/88 p92  132/86 p88    Patient is taking medication as prescribed  Patient is tolerating medications well.    Patient is not monitoring Blood Pressure at home.   Last 3 weeks hasn't checked home BP    Current complaints: none  Disposition:  routed to provider for review.    Preferred pharmacy: [facility] Winn RN

## 2024-05-03 NOTE — TELEPHONE ENCOUNTER
Ivania Trivedi is a 64 year old year old patient who comes in today for a Blood Pressure check because of new medication.  *OV 4/2: provider started Losartan 25mg  *pt had labs today     Vital Signs as repeated by RN   136/88 p92  132/86 p88     Patient is taking medication as prescribed  Patient is tolerating medications well.    Patient is not monitoring Blood Pressure at home.   Last 3 weeks hasn't checked home BP    Current complaints: none  Disposition:  routed to provider for review.     Preferred pharmacy: [facility] Winn RN

## 2024-05-10 ENCOUNTER — OFFICE VISIT (OUTPATIENT)
Dept: FAMILY MEDICINE | Facility: CLINIC | Age: 64
End: 2024-05-10
Payer: COMMERCIAL

## 2024-05-10 VITALS
BODY MASS INDEX: 33.8 KG/M2 | DIASTOLIC BLOOD PRESSURE: 84 MMHG | HEIGHT: 64 IN | HEART RATE: 87 BPM | RESPIRATION RATE: 16 BRPM | OXYGEN SATURATION: 95 % | SYSTOLIC BLOOD PRESSURE: 128 MMHG | WEIGHT: 198 LBS | TEMPERATURE: 97.6 F

## 2024-05-10 DIAGNOSIS — L91.8 SKIN TAG: ICD-10-CM

## 2024-05-10 DIAGNOSIS — R87.810 CERVICAL HIGH RISK HPV (HUMAN PAPILLOMAVIRUS) TEST POSITIVE: ICD-10-CM

## 2024-05-10 DIAGNOSIS — Z01.818 PREOP GENERAL PHYSICAL EXAM: Primary | ICD-10-CM

## 2024-05-10 PROCEDURE — 11200 RMVL SKIN TAGS UP TO&INC 15: CPT | Performed by: FAMILY MEDICINE

## 2024-05-10 PROCEDURE — 99214 OFFICE O/P EST MOD 30 MIN: CPT | Mod: 25 | Performed by: FAMILY MEDICINE

## 2024-05-10 ASSESSMENT — PAIN SCALES - GENERAL: PAINLEVEL: NO PAIN (0)

## 2024-05-10 NOTE — PATIENT INSTRUCTIONS
Take losartan the evening before surgery,   no Medications on the day of surgery.    No NSAIDs (ibuprofen, advil, aleve, aspirin) 7 days before surgery.    Stop all supplements 2 weeks prior to surgery. (Fish Oil, glucosamine chondroitin)    OK to take tylenol.    Preparing for Your Surgery  Getting started  A nurse will call you to review your health history and instructions. They will give you an arrival time based on your scheduled surgery time. Please be ready to share:  Your doctor's clinic name and phone number  Your medical, surgical, and anesthesia history  A list of allergies and sensitivities  A list of medicines, including herbal treatments and over-the-counter drugs  Whether the patient has a legal guardian (ask how to send us the papers in advance)  Please tell us if you're pregnant--or if there's any chance you might be pregnant. Some surgeries may injure a fetus (unborn baby), so they require a pregnancy test. Surgeries that are safe for a fetus don't always need a test, and you can choose whether to have one.   If you have a child who's having surgery, please ask for a copy of Preparing for Your Child's Surgery.    Preparing for surgery  Within 10 to 30 days of surgery: Have a pre-op exam (sometimes called an H&P, or History and Physical). This can be done at a clinic or pre-operative center.  If you're having a , you may not need this exam. Talk to your care team.  At your pre-op exam, talk to your care team about all medicines you take. If you need to stop any medicines before surgery, ask when to start taking them again.  We do this for your safety. Many medicines can make you bleed too much during surgery. Some change how well surgery (anesthesia) drugs work.  Call your insurance company to let them know you're having surgery. (If you don't have insurance, call [phone].)  Call your clinic if there's any change in your health. This includes signs of a cold or flu (sore throat, runny  nose, cough, rash, fever). It also includes a scrape or scratch near the surgery site.  If you have questions on the day of surgery, call your hospital or surgery center.  Eating and drinking guidelines  For your safety: Unless your surgeon tells you otherwise, follow the guidelines below.  Eat and drink as usual until 8 hours before you arrive for surgery. After that, no food or milk.  Drink clear liquids until 2 hours before you arrive. These are liquids you can see through, like water, Gatorade, and Propel Water. They also include plain black coffee and tea (no cream or milk), candy, and breath mints. You can spit out gum when you arrive.  If you drink alcohol: Stop drinking it the night before surgery.  If your care team tells you to take medicine on the morning of surgery, it's okay to take it with a sip of water.  Preventing infection  Shower or bathe the night before and morning of your surgery. Follow the instructions your clinic gave you. (If no instructions, use regular soap.)  Don't shave or clip hair near your surgery site. We'll remove the hair if needed.  Don't smoke or vape the morning of surgery. You may chew nicotine gum up to 2 hours before surgery. A nicotine patch is okay.  Note: Some surgeries require you to completely quit smoking and nicotine. Check with your surgeon.  Your care team will make every effort to keep you safe from infection. We will:  Clean our hands often with soap and water (or an alcohol-based hand rub).  Clean the skin at your surgery site with a special soap that kills germs.  Give you a special gown to keep you warm. (Cold raises the risk of infection.)  Wear special hair covers, masks, gowns and gloves during surgery.  Give antibiotic medicine, if prescribed. Not all surgeries need antibiotics.  What to bring on the day of surgery  Photo ID and insurance card  Copy of your health care directive, if you have one  Glasses and hearing aids (bring cases)  You can't wear  contacts during surgery  Inhaler and eye drops, if you use them (tell us about these when you arrive)  CPAP machine or breathing device, if you use them  A few personal items, if spending the night  If you have . . .  A pacemaker, ICD (cardiac defibrillator) or other implant: Bring the ID card.  An implanted stimulator: Bring the remote control.  A legal guardian: Bring a copy of the certified (court-stamped) guardianship papers.  Please remove any jewelry, including body piercings. Leave jewelry and other valuables at home.  If you're going home the day of surgery  You must have a responsible adult drive you home. They should stay with you overnight as well.  If you don't have someone to stay with you, and you aren't safe to go home alone, we may keep you overnight. Insurance often won't pay for this.  After surgery  If it's hard to control your pain or you need more pain medicine, please call your surgeon's office.  Questions?   If you have any questions for your care team, list them here: _________________________________________________________________________________________________________________________________________________________________________ ____________________________________ ____________________________________ ____________________________________  For informational purposes only. Not to replace the advice of your health care provider. Copyright   2003, 2019 [facility]. All rights reserved. Clinically reviewed by Indira Capone MD. [employer] [id number] - REV 12/22.

## 2024-05-10 NOTE — PROGRESS NOTES
Preoperative Evaluation  [facility]  [address] [address]  Phone: [phone]  Primary Provider: Vinicio Allen  Pre-op Performing Provider: VINICIO ALLEN  May 10, 2024       Ivania is a 64 year old, presenting for the following:  Pre-Op Exam (6/6/2024 Hysterectomy with Dr. Muñoz)        5/10/2024    10:46 AM   Additional Questions   Roomed by Sury Shi   Accompanied by self         5/10/2024    10:46 AM   Patient Reported Additional Medications   Patient reports taking the following new medications none     Surgical Information  Surgery/Procedure: Transvaginal hysterectomy, bilateral salpingo-oophorectomy, cystoscopy   Surgery Location: [facility]  Surgeon: Dr. Muñoz  Surgery Date: 6/6/2024  Time of Surgery: 7:30 AM  Where patient plans to recover: At home with family  Fax number for surgical facility: Note does not need to be faxed, will be available electronically in Epic.    Assessment & Plan     The proposed surgical procedure is considered INTERMEDIATE risk.    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      Skin tag: discussed treatment options  Patient would like cryotherapy today.       Risks and Recommendations  The patient has the following additional risks and recommendations for perioperative complications:   - No identified additional risk factors other than previously addressed    Antiplatelet or Anticoagulation Medication Instructions   - Patient is on no antiplatelet or anticoagulation medications.    Additional Medication Instructions  Patient is to take all scheduled medications on the day of surgery EXCEPT for modifications listed below:   - ACE/ARB: HOLD on day of surgery (minimum 11 hours for general anesthesia).   - Herbal medications and vitamins: HOLD 14 days prior to  surgery.    Recommendation  APPROVAL GIVEN to proceed with proposed procedure, without further diagnostic evaluation.      Subjective       HPI related to upcoming procedure: Abnormal paps with CHAD 1 and high risk HPV s/p LEEP 5/24/22. And still having high risk HPV.        5/7/2024     8:58 AM   Preop Questions   1. Have you ever had a heart attack or stroke? No   2. Have you ever had surgery on your heart or blood vessels, such as a stent placement, a coronary artery bypass, or surgery on an artery in your head, neck, heart, or legs? No   3. Do you have chest pain with activity? No   4. Do you have a history of  heart failure? No   5. Do you currently have a cold, bronchitis or symptoms of other infection? No   6. Do you have a cough, shortness of breath, or wheezing? No   7. Do you or anyone in your family have previous history of blood clots? No   8. Do you or does anyone in your family have a serious bleeding problem such as prolonged bleeding following surgeries or cuts? No   9. Have you ever had problems with anemia or been told to take iron pills? No   10. Have you had any abnormal blood loss such as black, tarry or bloody stools, or abnormal vaginal bleeding? No   11. Have you ever had a blood transfusion? No   12. Are you willing to have a blood transfusion if it is medically needed before, during, or after your surgery? Yes   13. Have you or any of your relatives ever had problems with anesthesia? No   14. Do you have sleep apnea, excessive snoring or daytime drowsiness? No   15. Do you have any artifical heart valves or other implanted medical devices like a pacemaker, defibrillator, or continuous glucose monitor? No   16. Do you have artificial joints? No   17. Are you allergic to latex? No       Health Care Directive  Patient does not have a Health Care Directive or Living Will: Discussed advance care planning with patient; information given to patient to review.    Preoperative Review of     reviewed - no record of controlled substances prescribed.      Status of Chronic Conditions:  HYPERTENSION - Patient has longstanding history of HTN , currently denies any symptoms referable to elevated blood pressure. Specifically denies chest pain, palpitations, dyspnea, orthopnea, PND or peripheral edema. Blood pressure readings have been in normal range. Current medication regimen is as listed below. Patient denies any side effects of medication.     Patient Active Problem List    Diagnosis Date Noted    Benign essential hypertension 04/02/2024     Priority: Medium    Class 2 severe obesity due to excess calories with serious comorbidity in adult (H) 04/02/2024     Priority: Medium    Vaginal atrophy 04/17/2015     Priority: Medium    Cervical high risk HPV (human papillomavirus) test positive 04/05/2015     Priority: Medium     3/25/15 ASCUS, +HR HPV. Plan [place]  4/17/15 Colpo ECC negative. Cotesting in 1 year  3/28/17 NIL/neg HPV. Repeat cotest in 3 yrs   2/12/21 NIL pap, +HR HPV (not 16 or 18). Plan cotest in 1 year due bef 2/12/22.  3/30/22 NIL pap, +HR HPV (not 16/18). Plan: colposcopy due before 6/30/22 4/28/22 Colpo ECC CHAD I. Plan: LEEP per provider due before 7/28/22 5/24/22 LEEP negative. Plan: cotest in 6-12 months  2/27/24 NIL pap, +HR HPV (not 16/18). Plan: colp per provider, due before 5/27/24  3/14/24 Colpo bx and ECC neg. Plan: hyst  6/6/24 Hyst scheduled      History of bilateral breast reduction surgery 03/25/2015     Priority: Medium    Lump or mass in breast 03/03/2015     Priority: Medium    Scar condition and fibrosis of skin 05/27/2014     Priority: Medium      Past Medical History:   Diagnosis Date    ASCUS with positive high risk HPV 04/05/2015    3/25/2015:Pap--ASCUS, +HR HPV. Plan [place]-     Benign essential hypertension 4/2/2024    H/O colposcopy with cervical biopsy 04/17/2015    Bx & ECC - Negative    Hypertrophy of breast      Past Surgical History:   Procedure Laterality Date     BIOPSY BREAST Left 04/02/2015    Procedure: BIOPSY BREAST;  Surgeon: Otoniel Jimenez MD;  Location: [facility] OR    CHOLECYSTECTOMY      COLONOSCOPY      GALLBLADDER SURGERY  age 31    laparoscopic    MAMMOPLASTY REDUCTION BILATERAL  05/20/2014    Procedure: MAMMOPLASTY REDUCTION BILATERAL;  Surgeon: Sue Guerra MD;  Location: [facility] OR     Current Outpatient Medications   Medication Sig Dispense Refill    losartan (COZAAR) 25 MG tablet Take 1 tablet (25 mg) by mouth daily 90 tablet 3    Multiple Vitamin (MULTIVITAMINS PO) Take 1 tablet by mouth daily       betamethasone dipropionate (DIPROSONE) 0.05 % external cream Apply topically 2 times daily (Patient not taking: Reported on 5/10/2024) 15 g 1       Allergies   Allergen Reactions    Penicillins Rash     LW Reaction: Rash, Generalized        Social History     Tobacco Use    Smoking status: Never    Smokeless tobacco: Never   Substance Use Topics    Alcohol use: Yes     Comment: ocass. 4 beers a month       History   Drug Use No         Review of Systems    Review of Systems  CONSTITUTIONAL: NEGATIVE for fever, chills, change in weight  INTEGUMENTARY/SKIN: NEGATIVE for worrisome rashes, moles or lesions  EYES: NEGATIVE for vision changes or irritation  ENT/MOUTH: dentures NEGATIVE for ear, mouth and throat problems  RESP: NEGATIVE for significant cough or SOB  BREAST: NEGATIVE for masses, tenderness or discharge  CV: NEGATIVE for chest pain, palpitations or peripheral edema  GI: NEGATIVE for nausea, abdominal pain, heartburn, or change in bowel habits  : NEGATIVE for frequency, dysuria, or hematuria  MUSCULOSKELETAL: NEGATIVE for significant arthralgias or myalgia  NEURO: NEGATIVE for weakness, dizziness or paresthesias  ENDOCRINE: NEGATIVE for temperature intolerance, skin/hair changes  HEME: NEGATIVE for bleeding problems  PSYCHIATRIC: NEGATIVE for changes in mood or affect    Objective    /84 (BP Location: Right arm, Patient Position: Sitting,  "Cuff Size: Adult Large)   Pulse 87   Temp 97.6  F (36.4  C) (Tympanic)   Resp 16   Ht 1.613 m (5' 3.5\")   Wt 89.8 kg (198 lb)   LMP 10/01/2012   SpO2 95%   BMI 34.52 kg/m     Estimated body mass index is 34.52 kg/m  as calculated from the following:    Height as of this encounter: 1.613 m (5' 3.5\").    Weight as of this encounter: 89.8 kg (198 lb).  Physical Exam  GENERAL: alert and no distress  NECK: no adenopathy, no asymmetry, masses, or scars  RESP: lungs clear to auscultation - no rales, rhonchi or wheezes  CV: regular rate and rhythm, normal S1 S2, no S3 or S4, no murmur, click or rub, no peripheral edema  ABDOMEN: soft, nontender, no hepatosplenomegaly, no masses and bowel sounds normal  MS: no gross musculoskeletal defects noted, no edema  SKIN: skin tag on left collar bone area  The lesion was frozen to an ice ball of 3mm beyond the lesion and allowed to completely thaw and the freeze/thaw cycle was repeated one more times.        Recent Labs   Lab Test 05/03/24  0842 02/27/24  0839    139   POTASSIUM 4.3 4.0   CR 1.00* 0.99*        Diagnostics  No labs were ordered during this visit.   No EKG required, no history of coronary heart disease, significant arrhythmia, peripheral arterial disease or other structural heart disease.    Revised Cardiac Risk Index (RCRI)  The patient has the following serious cardiovascular risks for perioperative complications:   - No serious cardiac risks = 0 points     RCRI Interpretation: 0 points: Class I (very low risk - 0.4% complication rate)         Signed Electronically by: Ramiro Owen MD  Copy of this evaluation report is provided to requesting physician.         "

## 2024-05-21 ENCOUNTER — TELEPHONE (OUTPATIENT)
Dept: OBGYN | Facility: CLINIC | Age: 64
End: 2024-05-21
Payer: COMMERCIAL

## 2024-05-21 ENCOUNTER — MYC MEDICAL ADVICE (OUTPATIENT)
Dept: OBGYN | Facility: CLINIC | Age: 64
End: 2024-05-21
Payer: COMMERCIAL

## 2024-05-28 NOTE — TELEPHONE ENCOUNTER
Kary Garces13 minutes ago (8:41 AM)     AS  Hello,    Yes, we left a message for patient on 05/22. I called the patient again today, advised her no auth is required with her insurance.

## 2024-06-05 ENCOUNTER — ANESTHESIA EVENT (OUTPATIENT)
Dept: SURGERY | Facility: CLINIC | Age: 64
End: 2024-06-05
Payer: COMMERCIAL

## 2024-06-05 NOTE — ANESTHESIA PREPROCEDURE EVALUATION
Anesthesia Pre-Procedure Evaluation    Patient: Ivania Antonio   MRN: [id number] : 1960        Procedure : Procedure(s):  Transvaginal hysterectomy, bilateral salpingo-oophorectomy, cystoscopy          Past Medical History:   Diagnosis Date    ASCUS with positive high risk HPV 2015    3/25/2015:Pap--ASCUS, +HR HPV. Plan [place]-     Benign essential hypertension 2024    H/O colposcopy with cervical biopsy 2015    Bx & ECC - Negative    Hypertrophy of breast       Past Surgical History:   Procedure Laterality Date    BIOPSY BREAST Left 2015    Procedure: BIOPSY BREAST;  Surgeon: Otoniel Jimenez MD;  Location: [facility]    COLONOSCOPY      GALLBLADDER SURGERY  age 31    laparoscopic    MAMMOPLASTY REDUCTION BILATERAL  2014    Procedure: MAMMOPLASTY REDUCTION BILATERAL;  Surgeon: Sue Guerra MD;  Location: [facility] OR      Allergies   Allergen Reactions    Penicillins Rash     LW Reaction: Rash, Generalized      Social History     Tobacco Use    Smoking status: Never    Smokeless tobacco: Never   Substance Use Topics    Alcohol use: Yes     Comment: ocass. 4 beers a month      Wt Readings from Last 1 Encounters:   05/10/24 89.8 kg (198 lb)        Anesthesia Evaluation   Pt has had prior anesthetic. Type: General.        ROS/MED HX  ENT/Pulmonary:    (-) tobacco use   Neurologic:       Cardiovascular:     (+)  hypertension- -   -  - -                                      METS/Exercise Tolerance: >4 METS    Hematologic:       Musculoskeletal:       GI/Hepatic:       Renal/Genitourinary:       Endo:     (+)               Obesity,       Psychiatric/Substance Use:    Alcohol abuse: Reports 4 drinks/week.   Infectious Disease:       Malignancy:   (+) Malignancy, History of Breast and Other.Other CA Cervix status post.    Other:            Physical Exam    Airway  airway exam normal      Mallampati: II   TM distance: > 3 FB   Neck ROM: full   Mouth opening: > 3 cm    Respiratory  "Devices and Support         Dental       (+) Edentulous      Cardiovascular   cardiovascular exam normal       Rhythm and rate: regular and normal     Pulmonary   pulmonary exam normal        breath sounds clear to auscultation       Other findings: Upper dentures    OUTSIDE LABS:  CBC: No results found for: \"WBC\", \"HGB\", \"HCT\", \"PLT\"  BMP:   Lab Results   Component Value Date     05/03/2024     02/27/2024    POTASSIUM 4.3 05/03/2024    POTASSIUM 4.0 02/27/2024    CHLORIDE 107 05/03/2024    CHLORIDE 105 02/27/2024    CO2 22 05/03/2024    CO2 23 02/27/2024    BUN 16.8 05/03/2024    BUN 12.1 02/27/2024    CR 1.00 (H) 05/03/2024    CR 0.99 (H) 02/27/2024     (H) 05/03/2024     (H) 02/27/2024     COAGS: No results found for: \"PTT\", \"INR\", \"FIBR\"  POC: No results found for: \"BGM\", \"HCG\", \"HCGS\"  HEPATIC: No results found for: \"ALBUMIN\", \"PROTTOTAL\", \"ALT\", \"AST\", \"GGT\", \"ALKPHOS\", \"BILITOTAL\", \"BILIDIRECT\", \"MARQUES\"  OTHER:   Lab Results   Component Value Date    NAOMI 9.3 05/03/2024       Anesthesia Plan    ASA Status:  2    NPO Status:  NPO Appropriate    Anesthesia Type: General.     - Airway: ETT   Induction: Intravenous.   Maintenance: TIVA.        Consents    Anesthesia Plan(s) and associated risks, benefits, and realistic alternatives discussed. Questions answered and patient/representative(s) expressed understanding.     - Discussed: Risks, Benefits and Alternatives for BOTH SEDATION and the PROCEDURE were discussed     - Discussed with:  Patient      - Extended Intubation/Ventilatory Support Discussed: No.      - Patient is DNR/DNI Status: No     Use of blood products discussed: No .     Postoperative Care    Pain management: IV analgesics, Oral pain medications, Multi-modal analgesia.   PONV prophylaxis: Ondansetron (or other 5HT-3), Dexamethasone or Solumedrol, Background Propofol Infusion     Comments:               ISAAC Gagnon CRNA    I have reviewed the pertinent notes and " "labs in the chart from the past 30 days and (re)examined the patient.  Any updates or changes from those notes are reflected in this note.              # Obesity: Estimated body mass index is 34.52 kg/m  as calculated from the following:    Height as of 5/10/24: 1.613 m (5' 3.5\").    Weight as of 5/10/24: 89.8 kg (198 lb).      "

## 2024-06-06 ENCOUNTER — HOSPITAL ENCOUNTER (OUTPATIENT)
Facility: CLINIC | Age: 64
Discharge: HOME | End: 2024-06-06
Attending: OBSTETRICS & GYNECOLOGY | Admitting: OBSTETRICS & GYNECOLOGY
Payer: COMMERCIAL

## 2024-06-06 ENCOUNTER — ANESTHESIA (OUTPATIENT)
Dept: SURGERY | Facility: CLINIC | Age: 64
End: 2024-06-06
Payer: COMMERCIAL

## 2024-06-06 VITALS
DIASTOLIC BLOOD PRESSURE: 85 MMHG | OXYGEN SATURATION: 94 % | TEMPERATURE: 97.1 F | SYSTOLIC BLOOD PRESSURE: 128 MMHG | HEART RATE: 70 BPM | BODY MASS INDEX: 33.8 KG/M2 | WEIGHT: 198 LBS | RESPIRATION RATE: 16 BRPM | HEIGHT: 64 IN

## 2024-06-06 DIAGNOSIS — N95.2 VAGINAL ATROPHY: ICD-10-CM

## 2024-06-06 DIAGNOSIS — Z90.710 S/P VAGINAL HYSTERECTOMY: Primary | ICD-10-CM

## 2024-06-06 LAB
ABO/RH(D): NORMAL
ANION GAP SERPL CALCULATED.3IONS-SCNC: 15 MMOL/L (ref 7–15)
ANTIBODY SCREEN: NEGATIVE
BASOPHILS # BLD AUTO: 0.1 10E3/UL (ref 0–0.2)
BASOPHILS NFR BLD AUTO: 1 %
BUN SERPL-MCNC: 15.7 MG/DL (ref 8–23)
CALCIUM SERPL-MCNC: 9.7 MG/DL (ref 8.8–10.2)
CHLORIDE SERPL-SCNC: 105 MMOL/L (ref 98–107)
CREAT SERPL-MCNC: 0.92 MG/DL (ref 0.51–0.95)
DEPRECATED HCO3 PLAS-SCNC: 20 MMOL/L (ref 22–29)
EGFRCR SERPLBLD CKD-EPI 2021: 70 ML/MIN/1.73M2
EOSINOPHIL # BLD AUTO: 0.1 10E3/UL (ref 0–0.7)
EOSINOPHIL NFR BLD AUTO: 2 %
ERYTHROCYTE [DISTWIDTH] IN BLOOD BY AUTOMATED COUNT: 13.7 % (ref 10–15)
GLUCOSE SERPL-MCNC: 108 MG/DL (ref 70–99)
HCT VFR BLD AUTO: 39.5 % (ref 35–47)
HGB BLD-MCNC: 13.5 G/DL (ref 11.7–15.7)
IMM GRANULOCYTES # BLD: 0 10E3/UL
IMM GRANULOCYTES NFR BLD: 0 %
LYMPHOCYTES # BLD AUTO: 2.6 10E3/UL (ref 0.8–5.3)
LYMPHOCYTES NFR BLD AUTO: 47 %
MCH RBC QN AUTO: 31.5 PG (ref 26.5–33)
MCHC RBC AUTO-ENTMCNC: 34.2 G/DL (ref 31.5–36.5)
MCV RBC AUTO: 92 FL (ref 78–100)
MONOCYTES # BLD AUTO: 0.5 10E3/UL (ref 0–1.3)
MONOCYTES NFR BLD AUTO: 9 %
NEUTROPHILS # BLD AUTO: 2.3 10E3/UL (ref 1.6–8.3)
NEUTROPHILS NFR BLD AUTO: 40 %
NRBC # BLD AUTO: 0 10E3/UL
NRBC BLD AUTO-RTO: 0 /100
PLATELET # BLD AUTO: 352 10E3/UL (ref 150–450)
POTASSIUM SERPL-SCNC: 4.1 MMOL/L (ref 3.4–5.3)
RBC # BLD AUTO: 4.29 10E6/UL (ref 3.8–5.2)
SODIUM SERPL-SCNC: 140 MMOL/L (ref 135–145)
SPECIMEN EXPIRATION DATE: NORMAL
WBC # BLD AUTO: 5.6 10E3/UL (ref 4–11)

## 2024-06-06 PROCEDURE — 272N000001 HC OR GENERAL SUPPLY STERILE: Performed by: OBSTETRICS & GYNECOLOGY

## 2024-06-06 PROCEDURE — 88307 TISSUE EXAM BY PATHOLOGIST: CPT | Mod: 26 | Performed by: PATHOLOGY

## 2024-06-06 PROCEDURE — 250N000009 HC RX 250: Performed by: NURSE ANESTHETIST, CERTIFIED REGISTERED

## 2024-06-06 PROCEDURE — 250N000009 HC RX 250

## 2024-06-06 PROCEDURE — 36415 COLL VENOUS BLD VENIPUNCTURE: CPT | Performed by: OBSTETRICS & GYNECOLOGY

## 2024-06-06 PROCEDURE — 258N000003 HC RX IP 258 OP 636: Performed by: OBSTETRICS & GYNECOLOGY

## 2024-06-06 PROCEDURE — 250N000011 HC RX IP 250 OP 636: Performed by: OBSTETRICS & GYNECOLOGY

## 2024-06-06 PROCEDURE — 999N000141 HC STATISTIC PRE-PROCEDURE NURSING ASSESSMENT: Performed by: OBSTETRICS & GYNECOLOGY

## 2024-06-06 PROCEDURE — 250N000011 HC RX IP 250 OP 636: Performed by: NURSE ANESTHETIST, CERTIFIED REGISTERED

## 2024-06-06 PROCEDURE — 360N000076 HC SURGERY LEVEL 3, PER MIN: Performed by: OBSTETRICS & GYNECOLOGY

## 2024-06-06 PROCEDURE — 85025 COMPLETE CBC W/AUTO DIFF WBC: CPT | Performed by: OBSTETRICS & GYNECOLOGY

## 2024-06-06 PROCEDURE — 88307 TISSUE EXAM BY PATHOLOGIST: CPT | Mod: TC | Performed by: OBSTETRICS & GYNECOLOGY

## 2024-06-06 PROCEDURE — 80048 BASIC METABOLIC PNL TOTAL CA: CPT | Performed by: OBSTETRICS & GYNECOLOGY

## 2024-06-06 PROCEDURE — 271N000001 HC OR GENERAL SUPPLY NON-STERILE: Performed by: OBSTETRICS & GYNECOLOGY

## 2024-06-06 PROCEDURE — 258N000003 HC RX IP 258 OP 636: Performed by: NURSE ANESTHETIST, CERTIFIED REGISTERED

## 2024-06-06 PROCEDURE — 58262 VAG HYST INCLUDING T/O: CPT | Performed by: OBSTETRICS & GYNECOLOGY

## 2024-06-06 PROCEDURE — 250N000013 HC RX MED GY IP 250 OP 250 PS 637: Performed by: OBSTETRICS & GYNECOLOGY

## 2024-06-06 PROCEDURE — 370N000017 HC ANESTHESIA TECHNICAL FEE, PER MIN: Performed by: OBSTETRICS & GYNECOLOGY

## 2024-06-06 PROCEDURE — 250N000011 HC RX IP 250 OP 636

## 2024-06-06 PROCEDURE — 86900 BLOOD TYPING SEROLOGIC ABO: CPT | Performed by: OBSTETRICS & GYNECOLOGY

## 2024-06-06 PROCEDURE — 710N000009 HC RECOVERY PHASE 1, LEVEL 1, PER MIN: Performed by: OBSTETRICS & GYNECOLOGY

## 2024-06-06 PROCEDURE — 710N000012 HC RECOVERY PHASE 2, PER MINUTE: Performed by: OBSTETRICS & GYNECOLOGY

## 2024-06-06 PROCEDURE — 250N000009 HC RX 250: Performed by: OBSTETRICS & GYNECOLOGY

## 2024-06-06 PROCEDURE — 250N000011 HC RX IP 250 OP 636: Mod: JZ | Performed by: OBSTETRICS & GYNECOLOGY

## 2024-06-06 RX ORDER — IBUPROFEN 400 MG/1
800 TABLET, FILM COATED ORAL ONCE
Status: DISCONTINUED | OUTPATIENT
Start: 2024-06-06 | End: 2024-06-06 | Stop reason: HOSPADM

## 2024-06-06 RX ORDER — SODIUM CHLORIDE, SODIUM LACTATE, POTASSIUM CHLORIDE, CALCIUM CHLORIDE 600; 310; 30; 20 MG/100ML; MG/100ML; MG/100ML; MG/100ML
INJECTION, SOLUTION INTRAVENOUS CONTINUOUS
Status: DISCONTINUED | OUTPATIENT
Start: 2024-06-06 | End: 2024-06-06 | Stop reason: HOSPADM

## 2024-06-06 RX ORDER — DEXAMETHASONE SODIUM PHOSPHATE 4 MG/ML
INJECTION, SOLUTION INTRA-ARTICULAR; INTRALESIONAL; INTRAMUSCULAR; INTRAVENOUS; SOFT TISSUE PRN
Status: DISCONTINUED | OUTPATIENT
Start: 2024-06-06 | End: 2024-06-06

## 2024-06-06 RX ORDER — ONDANSETRON 2 MG/ML
4 INJECTION INTRAMUSCULAR; INTRAVENOUS EVERY 30 MIN PRN
Status: DISCONTINUED | OUTPATIENT
Start: 2024-06-06 | End: 2024-06-06 | Stop reason: HOSPADM

## 2024-06-06 RX ORDER — ACETAMINOPHEN 325 MG/1
975 TABLET ORAL ONCE
Status: DISCONTINUED | OUTPATIENT
Start: 2024-06-06 | End: 2024-06-06

## 2024-06-06 RX ORDER — ACETAMINOPHEN 325 MG/1
975 TABLET ORAL ONCE
Status: COMPLETED | OUTPATIENT
Start: 2024-06-06 | End: 2024-06-06

## 2024-06-06 RX ORDER — HYDROMORPHONE HYDROCHLORIDE 2 MG/1
2-4 TABLET ORAL
Status: COMPLETED | OUTPATIENT
Start: 2024-06-06 | End: 2024-06-06

## 2024-06-06 RX ORDER — IBUPROFEN 800 MG/1
800 TABLET, FILM COATED ORAL EVERY 6 HOURS PRN
Qty: 30 TABLET | Refills: 0 | Status: SHIPPED | OUTPATIENT
Start: 2024-06-06

## 2024-06-06 RX ORDER — HYDROMORPHONE HYDROCHLORIDE 2 MG/1
2-4 TABLET ORAL EVERY 4 HOURS PRN
Qty: 18 TABLET | Refills: 0 | Status: SHIPPED | OUTPATIENT
Start: 2024-06-06 | End: 2024-07-08

## 2024-06-06 RX ORDER — MAGNESIUM SULFATE HEPTAHYDRATE 40 MG/ML
2 INJECTION, SOLUTION INTRAVENOUS ONCE
Status: DISCONTINUED | OUTPATIENT
Start: 2024-06-06 | End: 2024-06-06 | Stop reason: HOSPADM

## 2024-06-06 RX ORDER — PROPOFOL 10 MG/ML
INJECTION, EMULSION INTRAVENOUS CONTINUOUS PRN
Status: DISCONTINUED | OUTPATIENT
Start: 2024-06-06 | End: 2024-06-06

## 2024-06-06 RX ORDER — DEXAMETHASONE SODIUM PHOSPHATE 4 MG/ML
4 INJECTION, SOLUTION INTRA-ARTICULAR; INTRALESIONAL; INTRAMUSCULAR; INTRAVENOUS; SOFT TISSUE
Status: DISCONTINUED | OUTPATIENT
Start: 2024-06-06 | End: 2024-06-06 | Stop reason: HOSPADM

## 2024-06-06 RX ORDER — TRANEXAMIC ACID 10 MG/ML
1 INJECTION, SOLUTION INTRAVENOUS ONCE
Status: COMPLETED | OUTPATIENT
Start: 2024-06-06 | End: 2024-06-06

## 2024-06-06 RX ORDER — AMOXICILLIN 250 MG
1-2 CAPSULE ORAL 2 TIMES DAILY PRN
Qty: 30 TABLET | Refills: 0 | Status: SHIPPED | OUTPATIENT
Start: 2024-06-06

## 2024-06-06 RX ORDER — FENTANYL CITRATE 50 UG/ML
INJECTION, SOLUTION INTRAMUSCULAR; INTRAVENOUS PRN
Status: DISCONTINUED | OUTPATIENT
Start: 2024-06-06 | End: 2024-06-06

## 2024-06-06 RX ORDER — PHENAZOPYRIDINE HYDROCHLORIDE 200 MG/1
200 TABLET, FILM COATED ORAL ONCE
Status: COMPLETED | OUTPATIENT
Start: 2024-06-06 | End: 2024-06-06

## 2024-06-06 RX ORDER — GABAPENTIN 300 MG/1
300 CAPSULE ORAL
Status: COMPLETED | OUTPATIENT
Start: 2024-06-06 | End: 2024-06-06

## 2024-06-06 RX ORDER — OXYCODONE HYDROCHLORIDE 5 MG/1
10 TABLET ORAL
Status: DISCONTINUED | OUTPATIENT
Start: 2024-06-06 | End: 2024-06-06 | Stop reason: HOSPADM

## 2024-06-06 RX ORDER — ONDANSETRON 2 MG/ML
INJECTION INTRAMUSCULAR; INTRAVENOUS PRN
Status: DISCONTINUED | OUTPATIENT
Start: 2024-06-06 | End: 2024-06-06

## 2024-06-06 RX ORDER — NALOXONE HYDROCHLORIDE 0.4 MG/ML
0.1 INJECTION, SOLUTION INTRAMUSCULAR; INTRAVENOUS; SUBCUTANEOUS
Status: DISCONTINUED | OUTPATIENT
Start: 2024-06-06 | End: 2024-06-06 | Stop reason: HOSPADM

## 2024-06-06 RX ORDER — LIDOCAINE HYDROCHLORIDE AND EPINEPHRINE 10; 10 MG/ML; UG/ML
INJECTION, SOLUTION INFILTRATION; PERINEURAL PRN
Status: DISCONTINUED | OUTPATIENT
Start: 2024-06-06 | End: 2024-06-06 | Stop reason: HOSPADM

## 2024-06-06 RX ORDER — ONDANSETRON 4 MG/1
4 TABLET, ORALLY DISINTEGRATING ORAL EVERY 30 MIN PRN
Status: DISCONTINUED | OUTPATIENT
Start: 2024-06-06 | End: 2024-06-06 | Stop reason: HOSPADM

## 2024-06-06 RX ORDER — MEPERIDINE HYDROCHLORIDE 25 MG/ML
12.5 INJECTION INTRAMUSCULAR; INTRAVENOUS; SUBCUTANEOUS EVERY 5 MIN PRN
Status: DISCONTINUED | OUTPATIENT
Start: 2024-06-06 | End: 2024-06-06 | Stop reason: HOSPADM

## 2024-06-06 RX ORDER — HYDROMORPHONE HCL IN WATER/PF 6 MG/30 ML
0.2 PATIENT CONTROLLED ANALGESIA SYRINGE INTRAVENOUS EVERY 5 MIN PRN
Status: DISCONTINUED | OUTPATIENT
Start: 2024-06-06 | End: 2024-06-06 | Stop reason: HOSPADM

## 2024-06-06 RX ORDER — LIDOCAINE HYDROCHLORIDE 20 MG/ML
INJECTION, SOLUTION INFILTRATION; PERINEURAL PRN
Status: DISCONTINUED | OUTPATIENT
Start: 2024-06-06 | End: 2024-06-06

## 2024-06-06 RX ORDER — HYDROMORPHONE HCL IN WATER/PF 6 MG/30 ML
0.4 PATIENT CONTROLLED ANALGESIA SYRINGE INTRAVENOUS EVERY 5 MIN PRN
Status: DISCONTINUED | OUTPATIENT
Start: 2024-06-06 | End: 2024-06-06 | Stop reason: HOSPADM

## 2024-06-06 RX ORDER — FENTANYL CITRATE 50 UG/ML
50 INJECTION, SOLUTION INTRAMUSCULAR; INTRAVENOUS EVERY 5 MIN PRN
Status: DISCONTINUED | OUTPATIENT
Start: 2024-06-06 | End: 2024-06-06 | Stop reason: HOSPADM

## 2024-06-06 RX ORDER — ESTRADIOL 0.1 MG/G
2 CREAM VAGINAL
Qty: 42.5 G | Refills: 3 | Status: SHIPPED | OUTPATIENT
Start: 2024-06-07

## 2024-06-06 RX ORDER — CEFAZOLIN SODIUM/WATER 2 G/20 ML
2 SYRINGE (ML) INTRAVENOUS
Status: COMPLETED | OUTPATIENT
Start: 2024-06-06 | End: 2024-06-06

## 2024-06-06 RX ORDER — OXYCODONE HYDROCHLORIDE 5 MG/1
5 TABLET ORAL
Status: DISCONTINUED | OUTPATIENT
Start: 2024-06-06 | End: 2024-06-06 | Stop reason: HOSPADM

## 2024-06-06 RX ORDER — KETOROLAC TROMETHAMINE 30 MG/ML
INJECTION, SOLUTION INTRAMUSCULAR; INTRAVENOUS PRN
Status: DISCONTINUED | OUTPATIENT
Start: 2024-06-06 | End: 2024-06-06

## 2024-06-06 RX ORDER — PROPOFOL 10 MG/ML
INJECTION, EMULSION INTRAVENOUS PRN
Status: DISCONTINUED | OUTPATIENT
Start: 2024-06-06 | End: 2024-06-06

## 2024-06-06 RX ORDER — FENTANYL CITRATE 50 UG/ML
25 INJECTION, SOLUTION INTRAMUSCULAR; INTRAVENOUS EVERY 5 MIN PRN
Status: DISCONTINUED | OUTPATIENT
Start: 2024-06-06 | End: 2024-06-06 | Stop reason: HOSPADM

## 2024-06-06 RX ORDER — LIDOCAINE 40 MG/G
CREAM TOPICAL
Status: DISCONTINUED | OUTPATIENT
Start: 2024-06-06 | End: 2024-06-06 | Stop reason: HOSPADM

## 2024-06-06 RX ORDER — CEFAZOLIN SODIUM/WATER 2 G/20 ML
2 SYRINGE (ML) INTRAVENOUS SEE ADMIN INSTRUCTIONS
Status: DISCONTINUED | OUTPATIENT
Start: 2024-06-06 | End: 2024-06-06 | Stop reason: HOSPADM

## 2024-06-06 RX ADMIN — FENTANYL CITRATE 50 MCG: 50 INJECTION INTRAMUSCULAR; INTRAVENOUS at 09:26

## 2024-06-06 RX ADMIN — SODIUM CHLORIDE, POTASSIUM CHLORIDE, SODIUM LACTATE AND CALCIUM CHLORIDE: 600; 310; 30; 20 INJECTION, SOLUTION INTRAVENOUS at 06:50

## 2024-06-06 RX ADMIN — PHENYLEPHRINE HYDROCHLORIDE 0.2 MCG/KG/MIN: 10 INJECTION INTRAVENOUS at 07:34

## 2024-06-06 RX ADMIN — HYDROMORPHONE HYDROCHLORIDE 4 MG: 2 TABLET ORAL at 10:54

## 2024-06-06 RX ADMIN — ONDANSETRON 4 MG: 2 INJECTION INTRAMUSCULAR; INTRAVENOUS at 08:59

## 2024-06-06 RX ADMIN — TRANEXAMIC ACID 1 G: 10 INJECTION, SOLUTION INTRAVENOUS at 06:52

## 2024-06-06 RX ADMIN — LIDOCAINE HYDROCHLORIDE 0.1 ML: 10 INJECTION, SOLUTION EPIDURAL; INFILTRATION; INTRACAUDAL; PERINEURAL at 06:51

## 2024-06-06 RX ADMIN — LIDOCAINE HYDROCHLORIDE 100 MG: 20 INJECTION, SOLUTION INFILTRATION; PERINEURAL at 07:29

## 2024-06-06 RX ADMIN — DEXAMETHASONE SODIUM PHOSPHATE 4 MG: 4 INJECTION, SOLUTION INTRA-ARTICULAR; INTRALESIONAL; INTRAMUSCULAR; INTRAVENOUS; SOFT TISSUE at 07:33

## 2024-06-06 RX ADMIN — FENTANYL CITRATE 100 MCG: 50 INJECTION INTRAMUSCULAR; INTRAVENOUS at 07:29

## 2024-06-06 RX ADMIN — KETOROLAC TROMETHAMINE 15 MG: 30 INJECTION, SOLUTION INTRAMUSCULAR at 09:02

## 2024-06-06 RX ADMIN — PROPOFOL 200 MG: 10 INJECTION, EMULSION INTRAVENOUS at 07:29

## 2024-06-06 RX ADMIN — HYDROMORPHONE HYDROCHLORIDE 0.2 MG: 1 INJECTION, SOLUTION INTRAMUSCULAR; INTRAVENOUS; SUBCUTANEOUS at 08:07

## 2024-06-06 RX ADMIN — ROCURONIUM BROMIDE 50 MG: 50 INJECTION, SOLUTION INTRAVENOUS at 07:30

## 2024-06-06 RX ADMIN — PHENAZOPYRIDINE 200 MG: 200 TABLET ORAL at 06:40

## 2024-06-06 RX ADMIN — HYDROMORPHONE HYDROCHLORIDE 0.2 MG: 0.2 INJECTION, SOLUTION INTRAMUSCULAR; INTRAVENOUS; SUBCUTANEOUS at 09:58

## 2024-06-06 RX ADMIN — FENTANYL CITRATE 50 MCG: 50 INJECTION INTRAMUSCULAR; INTRAVENOUS at 09:39

## 2024-06-06 RX ADMIN — MIDAZOLAM 2 MG: 1 INJECTION INTRAMUSCULAR; INTRAVENOUS at 07:21

## 2024-06-06 RX ADMIN — ACETAMINOPHEN 975 MG: 325 TABLET, FILM COATED ORAL at 06:40

## 2024-06-06 RX ADMIN — ROCURONIUM BROMIDE 30 MG: 50 INJECTION, SOLUTION INTRAVENOUS at 08:00

## 2024-06-06 RX ADMIN — SUGAMMADEX 200 MG: 100 INJECTION, SOLUTION INTRAVENOUS at 09:02

## 2024-06-06 RX ADMIN — PROPOFOL 200 MCG/KG/MIN: 10 INJECTION, EMULSION INTRAVENOUS at 07:33

## 2024-06-06 RX ADMIN — ROCURONIUM BROMIDE 20 MG: 50 INJECTION, SOLUTION INTRAVENOUS at 08:30

## 2024-06-06 RX ADMIN — ACETAMINOPHEN 975 MG: 325 TABLET, FILM COATED ORAL at 12:52

## 2024-06-06 RX ADMIN — Medication 2 G: at 07:20

## 2024-06-06 RX ADMIN — GABAPENTIN 300 MG: 300 CAPSULE ORAL at 06:40

## 2024-06-06 ASSESSMENT — LIFESTYLE VARIABLES: TOBACCO_USE: 0

## 2024-06-06 ASSESSMENT — ACTIVITIES OF DAILY LIVING (ADL)
ADLS_ACUITY_SCORE: 18
ADLS_ACUITY_SCORE: 16
ADLS_ACUITY_SCORE: 18

## 2024-06-06 NOTE — OR NURSING
Preop meds given, maria t well. No pain. Just anxious about surgery. BP elevated and retaken. Crna aware. Labs drawn.  with pt preop. Voids x2 . No bleeding. Pt has upper dentures in and requests if she removes them in surgery to have them placed back in pacu.

## 2024-06-06 NOTE — DISCHARGE INSTRUCTIONS
Same Day Surgery Discharge Instructions  Special Precautions After Surgery - Adult    It is not unusual to feel lightheaded or faint, up to 24 hours after surgery or while taking pain medication.  If you have these symptoms; sit for a few minutes before standing and have someone assist you when getting up.  You should rest and relax for the next 24 hours and must have someone stay with you for at least 24 hours after your discharge.  DO NOT DRIVE any vehicle or operate mechanical equipment for 24 hours following the end of your surgery.  DO NOT DRIVE while taking narcotic pain medications that have been prescribed by your physician.  If you had a limb operated on, you must be able to use it fully to drive.  DO NOT drink alcoholic beverages for 24 hours following surgery or while taking prescription pain medication.  Drink clear liquids (apple juice, ginger ale, broth, 7-Up, etc.).  Progress to your regular diet as you feel able.  Any questions call your physician and do not make important decisions for 24        Call for an appointment to return to the clinic Follow up with Dr. Muñoz    Medications:  Per instructions from your doctor     __________________________________________________________________________________________________________________________________  IMPORTANT NUMBERS:    [facility] [place]  [phone], [phone]  Pharmacy:  [phone]  Same Day Surgery:  [phone], Monday - Friday until 8:30 p.m.  Urgent Care:  [phone]  Emergency Room:  [phone]      [facility]:  [phone]                                                                             [facility]:  [phone] option 1  [facility]:  [phone]     OB Clinic:  [phone]   [facility]:  [phone]   Home Medical Equipment: [phone]  [facility]:  [phone]

## 2024-06-06 NOTE — ANESTHESIA POSTPROCEDURE EVALUATION
Patient: Ivania Antonio    Procedure: Procedure(s):  Vaginal natural orifice transendoscopic surgery assisted vaginal hysterectomy, bilateral salpingo-oophorectomy, cystoscopy       Anesthesia Type:  General    Note:  Disposition: Outpatient   Postop Pain Control: Uneventful            Sign Out: Well controlled pain   PONV: No   Neuro/Psych: Uneventful            Sign Out: Acceptable/Baseline neuro status   Airway/Respiratory: Uneventful            Sign Out: Acceptable/Baseline resp. status   CV/Hemodynamics: Uneventful            Sign Out: Acceptable CV status; No obvious hypovolemia; No obvious fluid overload   Other NRE: NONE   DID A NON-ROUTINE EVENT OCCUR? No           Last vitals:  Vitals Value Taken Time   /73 06/06/24 1015   Temp 35.8  C (96.44  F) 06/06/24 1026   Pulse 80 06/06/24 1024   Resp 24 06/06/24 1024   SpO2 91 % 06/06/24 1023   Vitals shown include unfiled device data.    Electronically Signed By: ISAAC Prado CRNA  June 6, 2024  2:32 PM

## 2024-06-06 NOTE — ANESTHESIA CARE TRANSFER NOTE
Patient: Ivania Antonio    Procedure: Procedure(s):  Vaginal natural orifice transendoscopic surgery assisted vaginal hysterectomy, bilateral salpingo-oophorectomy, cystoscopy       Diagnosis: Cervical dysplasia [N87.9]  Diagnosis Additional Information: No value filed.    Anesthesia Type:   General     Note:    Oropharynx: oral airway in place and spontaneously breathing  Level of Consciousness: drowsy  Oxygen Supplementation: face mask  Level of Supplemental Oxygen (L/min / FiO2): 8  Independent Airway: airway patency not satisfactory and stable  Dentition: dentition changed  Vital Signs Stable: post-procedure vital signs reviewed and stable  Report to RN Given: handoff report given  Patient transferred to: PACU    Handoff Report: Identifed the Patient, Identified the Reponsible Provider, Reviewed the pertinent medical history, Discussed the surgical course, Reviewed Intra-OP anesthesia mangement and issues during anesthesia, Set expectations for post-procedure period and Allowed opportunity for questions and acknowledgement of understanding      Vitals:  Vitals Value Taken Time   BP 95/57 06/06/24 0915   Temp 35.5  C (95.9  F) 06/06/24 0918   Pulse 68 06/06/24 0918   Resp 14 06/06/24 0918   SpO2 96 % 06/06/24 0918   Vitals shown include unfiled device data.    Electronically Signed By: Sil Browne RN  June 6, 2024  9:19 AM

## 2024-06-06 NOTE — OP NOTE
[facility] Gynecologic Operative Note      Name: Ivania Antonio  MRN: [id number]  : 1960     Date: 2024     Preoperative Diagnosis: Persistent cervical dysplasia   Postoperative Diagnosis: same     Procedure:   Vaginal natural orifice transendoscopic surgery-assisted vaginal hysterectomy, bilateral salpingo-oophorectomy, cystoscopy      Surgeon: Mary Alice Muñoz MD  Assists: Dr. Jen Brody - PGY3 OB/GYN resident     Anesthesia: GETA  Complications: none apparent      EBL: 100 mL   IVF: see anesthesia record   UOP: 50 mL clear urine      Findings: Exam under anesthesia revealed mod vulvovaginal atrophy, but otherwise normal vulva, vagina and cervix without lesion or discharge. Bimanual exam with small, mid position uterus with no appreciable adnexal enlargement. Cystoscopy revealed no evidence of bladder injury or suture material. Bilateral ureteral urine jet stream noted.      Specimen: Uterus, cervix, bilateral fallopian tubes and ovaries      Indications: Ms. Antonio is a 64 year old female who presented to GYN clinic with persistent cervical dysplasia and significant pain with repeated colposcopic and LEEP procedures. She requested definitive surgical management after discussed of treatment options. I did discuss the risks of bleeding, infection as well as intra-abdominal injury (both recognized and unrecognized). She is agreeable to a blood transfusion if indicated.  She understands the indication for conversion to laparoscopy or laparotomy. Informed consent was signed     Procedure: The patient was taken to the operating room where GETA anesthesia was induced without difficulty. She was then examined under anesthesia with the above noted findings. She was then prepared and draped in the normal sterile fashion in the dorsal lithotomy position using yellow fin stirrups. A catheter was placed in the bladder.      Attention was then turned to the vagina where 1%lidocaine with epinephrine  and vasopressin was injected along the cervicovaginal junction. This plane was incised with cautery. The anterior peritoneum was entered with metzenbaum scissors and the posterior peritoneum was entered with chung scissors. Retractors were placed. The right uterosacral ligament was grasped with a Roc clamp, incised and suture ligated. This was repeated on the left uterosacral ligament. The right uterine artery was similarly clamp, incised and suture ligated. This was repeated on the left side. The remaining parametrial tissue with clamped, cut and suture ligated until the cervix and uterus could be removed vaginally. The fallopian tubes were then individually grasped with leila clamps, walked to the fimbriated ends, cross-clamped, excised and suture ligated along the mesosalpinx. The left ovary was similarly grasped with a leila clamp, clamped across the IP ligament, excised and the ligament suture ligated. Hemostasis assured. The right ovary was very flush to the right pelvic side wall so I did decide to convert to a V-NOTEs set-up. The V-NOTES retractors were placed in the vagina, the abdomen filled with CO2 gas. The right ovary was visualized with a laparoscopic camera and the ligasure device was used to serially cauterized and removed from the vagina. Once hemostasis was assured, the CO2 gas was expelled and all retractors and instruments removed.     The vaginal cuff was closed with [id number] vicryl sutures with care to incorporate the anterior and posterior peritoneum, as well as the uterosacral ligaments. A cystoscopy was performed that showed bilateral ureteral jets, confirming ureteral patency.      The patient tolerated the procedure well. Sponge, lap and needle counts were correct. The patient was taken to the recovery area in stable condition.     Mary Alice Muñoz MD

## 2024-06-06 NOTE — ANESTHESIA PROCEDURE NOTES
Airway       Patient location during procedure: OR       Procedure Start/Stop Times: 6/6/2024 7:32 AM  Staff -        CRNA: Sadiq Fisher APRN CRNA       Other Anesthesia Staff: Sil Browne RN       Performed By: SRNA  Consent for Airway        Urgency: elective  Indications and Patient Condition       Indications for airway management: emiliano-procedural       Induction type:intravenous       Mask difficulty assessment: 1 - vent by mask    Final Airway Details       Final airway type: endotracheal airway       Successful airway: ETT - single and Oral  Endotracheal Airway Details        ETT size (mm): 7.0       Cuffed: yes       Successful intubation technique: video laryngoscopy       VL Blade Size: Noel 3       Grade View of Cords: 1       Adjucts: bougie (bougie used for education purposes)       Position: Right       Measured from: lips       Secured at (cm): 21       Bite block used: None    Post intubation assessment        Placement verified by: capnometry, equal breath sounds and chest rise        Number of attempts at approach: 1       Number of other approaches attempted: 0       Secured with: tape       Ease of procedure: easy       Dentition: Intact and Unchanged    Medication(s) Administered   Medication Administration Time: 6/6/2024 7:32 AM

## 2024-06-08 ENCOUNTER — NURSE TRIAGE (OUTPATIENT)
Dept: NURSING | Facility: CLINIC | Age: 64
End: 2024-06-08
Payer: COMMERCIAL

## 2024-06-08 NOTE — TELEPHONE ENCOUNTER
Ivania, pt. Gave verbal consent to speak with spouse, David,     Nurse Triage SBAR    Is this a 2nd Level Triage? YES, LICENSED PRACTITIONER REVIEW IS REQUIRED    Situation: Post op vomiting only with Dilaudid    Background: Postop Day 3, vomiting only with Dilaudid, pain levels improving    Assessment:   -Hysterectomy on 6/6/2024, Surgeon: MD Mary Alice Muñoz GYN, Day 3,   -Per instructions, call md: Day 3 still vomiting, On Dilaudid for pain management,   -Anything to Help with Nausea  -Tolerating Regular diet  -Gag reflex, Vomiting Bile-Vomiting after Dilaudid, only   -Giving Dilaudid now 1 tab every 4 hours pain level 4-5/10 yesterday,  today pain level 2/10 prior to 1130 dose of Dilaudid,   -Alternating Tylenol every 6 hours and Ibuprofen every 6 hours: getting either medication every 3 hours  -Hx: Last surgery breast reduction, did NOT tolerate Oxycodone    -Spouse question for MD: Something for nausea with Dilaudid, current pain 2/10, what day should they expect to taper off narcotic, how slow, Medication for Nausea?       Protocol Recommended Disposition:   Go to ED Now (Or PCP Triage)    Recommendation:  2LT:     Provider consult indicated.     Reason for page:   Vomiting with Dilaudid only     Page sent to Dr. Muñoz by RN at 1209.     Provider, Dr. Muñoz, returning page to Nurse Advisors at 1210    Provider recommended plan of care: HOLD Dilaudid, cont. Alternating Tylenol and Ibuprofen every 3 hours, Use Dilaudid as needed, if she cont. To vomit only with Dilaudid, call back and MD will order Zofran. If pt. Cont. To vomit all the time, she needs to go to ER to be evaluated.   Pt. spouse notified and verbalized understanding and verbalized back MD recommendations. Triager advised to keep medication log with times and pain level. They already are but they will add her pain level to monitor also    Ansley Schumacher, RN        Reason for Disposition   [1] Vomiting AND [2] persists > 4 hours    Additional  Information   Negative: Sounds like a life-threatening emergency to the triager   Negative: [1] Widespread rash AND [2] bright red, sunburn-like   Negative: [1] SEVERE headache AND [2] after spinal (epidural) anesthesia    Protocols used: Post-Op Symptoms and [id number] Schumacher RN, Triage Nurse Advisor, 6/8/2024 12:18 PM

## 2024-07-08 ENCOUNTER — OFFICE VISIT (OUTPATIENT)
Dept: OBGYN | Facility: CLINIC | Age: 64
End: 2024-07-08
Payer: COMMERCIAL

## 2024-07-08 VITALS
HEIGHT: 64 IN | SYSTOLIC BLOOD PRESSURE: 114 MMHG | RESPIRATION RATE: 16 BRPM | WEIGHT: 201 LBS | TEMPERATURE: 97 F | BODY MASS INDEX: 34.31 KG/M2 | DIASTOLIC BLOOD PRESSURE: 68 MMHG | HEART RATE: 93 BPM

## 2024-07-08 DIAGNOSIS — N95.1 MENOPAUSAL SYNDROME (HOT FLASHES): ICD-10-CM

## 2024-07-08 DIAGNOSIS — Z98.890 POSTOPERATIVE STATE: Primary | ICD-10-CM

## 2024-07-08 PROCEDURE — 99213 OFFICE O/P EST LOW 20 MIN: CPT | Mod: 24 | Performed by: OBSTETRICS & GYNECOLOGY

## 2024-07-08 PROCEDURE — 99024 POSTOP FOLLOW-UP VISIT: CPT | Performed by: OBSTETRICS & GYNECOLOGY

## 2024-07-08 NOTE — PROGRESS NOTES
"[facility]    CC: Postop Visit    S: Pt doing well. She denies any complaints. Off any pain medications. Eating, drinking, urinating and moving bowels without any issues. Vaginal bleeding resolved. Having hot flashes.     O:   VS: /68 (BP Location: Left arm, Patient Position: Chair, Cuff Size: Adult Large)   Pulse 93   Temp 97  F (36.1  C) (Tympanic)   Resp 16   Ht 1.613 m (5' 3.5\")   Wt 91.2 kg (201 lb)   LMP 10/01/2012   BMI 35.05 kg/m    General: NAD  CV: Regular rate, warm and well perfused  Resp: breathing comfortably on room air   Abdomen: soft, non-tender, nondistended  Well healing vaginal cuff intact both digitally and visually  Extremities: non-tender, non-edematous     A/P: 63 yo 4wks post-op s/p V-notes, TVH, BSO  Appropriate postop recovery.   Reviewed pathology - benign.   Reviewed pelvic rest restrictions.   PAP/HPV discontinued from Health Maintenance. No hx of high grade dysplasia.   Continue vaginal estrogen for vulvovaginal atrophy.     RTC for annual exam or PRN.    Also wishes to address hot flashes: increase since surgery, uncertain if this is just recovery from surgery or from oophorectomy. Less likley from oophorectomy this far out from menopause. Will watch hot flashes; virtual visit in one month if still a problem. Not an HRT candidate (14 years out of menopause), but could consider veozah).    Mary Alice Muñoz MD, MD  [facility]    "

## 2024-07-08 NOTE — PROGRESS NOTES
"Initial /68 (BP Location: Left arm, Patient Position: Chair, Cuff Size: Adult Large)   Pulse 93   Temp 97  F (36.1  C) (Tympanic)   Resp 16   Ht 1.613 m (5' 3.5\")   Wt 91.2 kg (201 lb)   LMP 10/01/2012   BMI 35.05 kg/m   Estimated body mass index is 35.05 kg/m  as calculated from the following:    Height as of this encounter: 1.613 m (5' 3.5\").    Weight as of this encounter: 91.2 kg (201 lb). .    "

## 2024-08-01 ENCOUNTER — PATIENT OUTREACH (OUTPATIENT)
Dept: OBGYN | Facility: CLINIC | Age: 64
End: 2024-08-01
Payer: COMMERCIAL

## 2025-01-28 ENCOUNTER — PATIENT OUTREACH (OUTPATIENT)
Dept: CARE COORDINATION | Facility: CLINIC | Age: 65
End: 2025-01-28
Payer: COMMERCIAL

## 2025-04-05 ENCOUNTER — HEALTH MAINTENANCE LETTER (OUTPATIENT)
Age: 65
End: 2025-04-05

## 2025-04-16 ENCOUNTER — PATIENT OUTREACH (OUTPATIENT)
Dept: OBGYN | Facility: CLINIC | Age: 65
End: 2025-04-16

## 2025-04-16 NOTE — TELEPHONE ENCOUNTER
Panel Management Review      Health Maintenance List    Health Maintenance   Topic Date Due    HIV SCREENING  Never done    Pneumococcal Vaccine: 50+ Years (1 of 1 - PCV) Never done    INFLUENZA VACCINE (1) 09/01/2024    COVID-19 Vaccine (1 - 2024-25 season) Never done    PHQ-2 (once per calendar year)  01/01/2025    YEARLY PREVENTIVE VISIT  02/27/2025    COLORECTAL CANCER SCREENING  03/02/2025    DTAP/TDAP/TD IMMUNIZATION (3 - Td or Tdap) 03/25/2025    BMP  06/06/2025    MAMMO SCREENING  04/26/2026    DIABETES SCREENING  06/06/2027    LIPID  02/27/2029    ADVANCE CARE PLANNING  05/10/2029    RSV VACCINE (1 - 1-dose 75+ series) 04/28/2036    HEPATITIS C SCREENING  Completed    ZOSTER IMMUNIZATION  Addressed    HPV IMMUNIZATION  Aged Out    MENINGITIS IMMUNIZATION  Aged Out    HPV TEST  Discontinued    PAP  Discontinued    PAP FOLLOW-UP  Discontinued    HPV FOLLOW-UP  Discontinued       Composite cancer screening  Chart review shows that this patient is due/due soon for the following Colonoscopy  Lab Results   Component Value Date    PAP NIL 02/12/2021     Past Surgical History:   Procedure Laterality Date    BIOPSY BREAST Left 04/02/2015    Procedure: BIOPSY BREAST;  Surgeon: Otoniel Jimenez MD;  Location: [facility] OR    COLONOSCOPY      GALLBLADDER SURGERY  age 31    laparoscopic    HYSTERECTOMY, VAGINAL, WITH SALPINGECTOMY, CYSTOSCOPY Bilateral 6/6/2024    Procedure: Vaginal natural orifice transendoscopic surgery assisted vaginal hysterectomy, bilateral salpingo-oophorectomy, cystoscopy;  Surgeon: Mary Alice Muñoz MD;  Location: [facility] OR    MAMMOPLASTY REDUCTION BILATERAL  05/20/2014    Procedure: MAMMOPLASTY REDUCTION BILATERAL;  Surgeon: Sue Guerra MD;  Location: [facility] OR       Is hysterectomy listed in surgical history? Yes   Is mastectomy listed in surgical history? No     Summary:    Patient is due/failing the following:   Colonoscopy    Action needed: Patient needs office visit for  Colonoscopy.    Type of outreach:  Sent [employer] message.      Staff Signature:  Noris Grubbs CMA

## 2025-04-16 NOTE — LETTER
2025      Ivania Antonio  [address] [place] [address]              Dear Ivania,    To ensure we are providing the best quality care, we have reviewed your chart and see that you are due for:    Colon Cancer Screening:    If you have received a referral to schedule a Colonoscopy or choose to do a Colonoscopy instead of the FIT/ Cologuard test, please call [phone] to schedule.    If you have received a FIT test kit from a prior office visit, please check the expiration date. If , please get a new kit from the Lab/ Clinic. If not , please complete the test and mail in as soon as you are able.    If you would prefer to complete a Cologuard test, please reach out to your provider to see if this is an option for you.    You may call Dept: [phone] if you have any questions. If you have completed the tests outside of [facility], please have the results forwarded to our office Fax # [phone]. We will update the chart for your primary Physician to review before your next annual physical.        Sincerely,      Mary Alice Muñoz MD

## 2025-04-30 NOTE — PROGRESS NOTES
Patient alert and oriented x 4. Patient reports wheelchair bound at home. On oxygen per nasal cannula, patient reports no oxygen use at home. NSR on tele. Incontinent of bowel per patient. On dialysis per patient does not produce urine. No complaints of pain, shortness of breath, or chest pain/discomfort. POC discussed with patient. Fall precautions in place. Call light within reach.     Problem: SAFETY ADULT - FALL  Goal: Free from fall injury  Description: INTERVENTIONS:- Assess pt frequently for physical needs- Identify cognitive and physical deficits and behaviors that affect risk of falls.- Delta fall precautions as indicated by assessment.- Educate pt/family on patient safety including physical limitations- Instruct pt to call for assistance with activity based on assessment- Modify environment to reduce risk of injury- Provide assistive devices as appropriate- Consider OT/PT consult to assist with strengthening/mobility- Encourage toileting schedule  Outcome: Progressing     Problem: RESPIRATORY - ADULT  Goal: Achieves optimal ventilation and oxygenation  Description: INTERVENTIONS:- Assess for changes in respiratory status- Assess for changes in mentation and behavior- Position to facilitate oxygenation and minimize respiratory effort- Oxygen supplementation based on oxygen saturation or ABGs- Provide Smoking Cessation handout, if applicable- Encourage broncho-pulmonary hygiene including cough, deep breathe, Incentive Spirometry- Assess the need for suctioning and perform as needed- Assess and instruct to report SOB or any respiratory difficulty- Respiratory Therapy support as indicated- Manage/alleviate anxiety- Monitor for signs/symptoms of CO2 retention  Outcome: Progressing     Problem: HEMATOLOGIC - ADULT  Goal: Maintains hematologic stability  Description: INTERVENTIONS- Assess for signs and symptoms of bleeding or hemorrhage- Monitor labs and vital signs for trends- Administer supportive blood  [facility] Col[facility] Procedure Note    Ivania Antonio  1960  [id number]    Pap Hx  3/25/15 ASCUS, +HR HPV. Plan [place]  4/17/15 Colpo ECC negative. Cotesting in 1 year  3/28/17 NIL/neg HPV. Repeat cotest in 3 yrs   2/12/21 NIL pap, +HR HPV (not 16 or 18). Plan cotest in 1 year due bef 2/12/22.  3/30/22 NIL pap, +HR HPV (not 16/18). Plan: colposcopy due before 6/30/22 4/28/22 Colpo ECC CHAD I. Plan: LEEP per provider due before 7/28/22 5/24/22 LEEP negative. Plan: cotest in 6-12 months  2/27/24 NIL pap, +HR HPV (not 16/18). Plan: colp per provider, due before 5/27/24    The patient was counseled on the risks (including risk of pain and bleeding), benefits (diagnosis of lesion severity and depth in invasion). Verbal and written consent were obtained.      Technique: The patient was placed in the dorsal lithotomy position.  A speculum was placed in the vagina and the cervix visualized. Acetic acid was applied to the upper vagina and cervix and then visualized using the colposcope. Acetowhite staining was noted at a very faint [place] at 12 o'clock.  Lugol's solution was also placed on the cervix with no decreased uptake. The squamocolumnar junction and transformation zone were fully visualized and colposcopy was satisfactory. Colposcopic impression: normal.   Biopsies were taken at 12 o'clock. Endocervical curettage was performed.  The patient tolerated the procedure well.  She was given post op instructions which included activity and pelvic restrictions.      A/P: s/p colposcopy procedure per ASCCP guidelines.   Will MyChart to discuss results with patient.     Additionally, patient asks about the possibility of proceeding with a hysterectomy.  She reports significant discomfort with the repeated Pap smears and colposcopy.   I did discuss that hysterectomy could be an option for persistent cervical dysplasia and persistently positive HPV.  I did describe the steps of the procedure of a transvaginal  hysterectomy with likely bilateral salpingo-oophorectomy as well as the risks of bleeding, infection and intra-abdominal injury.  I reviewed the risks of conversion to laparoscopy or laparotomy.  I did discuss same-day surgery and recovery expectations.  The patient would like to see what the biopsy results show and then will let me know how she like to proceed.     Mary Alice Muñoz MD  OB/GYN   March 14, 2024     products/factors, fluids and medications as ordered and appropriate- Administer supportive blood products/factors as ordered and appropriate  Outcome: Progressing  Goal: Free from bleeding injury  Description: (Example usage: patient with low platelets)INTERVENTIONS:- Avoid intramuscular injections, enemas and rectal medication administration- Ensure safe mobilization of patient- Hold pressure on venipuncture sites to achieve adequate hemostasis- Assess for signs and symptoms of internal bleeding- Monitor lab trends- Patient is to report abnormal signs of bleeding to staff- Avoid use of toothpicks and dental floss- Use electric shaver for shaving- Use soft bristle tooth brush- Limit straining and forceful nose blowing  Outcome: Progressing

## 2025-05-07 ENCOUNTER — OFFICE VISIT (OUTPATIENT)
Dept: FAMILY MEDICINE | Facility: CLINIC | Age: 65
End: 2025-05-07
Payer: COMMERCIAL

## 2025-05-07 VITALS
OXYGEN SATURATION: 98 % | DIASTOLIC BLOOD PRESSURE: 86 MMHG | HEART RATE: 70 BPM | HEIGHT: 64 IN | BODY MASS INDEX: 31.41 KG/M2 | SYSTOLIC BLOOD PRESSURE: 134 MMHG | TEMPERATURE: 97 F | RESPIRATION RATE: 14 BRPM | WEIGHT: 184 LBS

## 2025-05-07 DIAGNOSIS — I10 BENIGN ESSENTIAL HYPERTENSION: ICD-10-CM

## 2025-05-07 DIAGNOSIS — Z00.00 ROUTINE GENERAL MEDICAL EXAMINATION AT A HEALTH CARE FACILITY: Primary | ICD-10-CM

## 2025-05-07 DIAGNOSIS — Z12.11 SCREEN FOR COLON CANCER: ICD-10-CM

## 2025-05-07 DIAGNOSIS — Z13.220 LIPID SCREENING: ICD-10-CM

## 2025-05-07 PROBLEM — E66.812 CLASS 2 SEVERE OBESITY DUE TO EXCESS CALORIES WITH SERIOUS COMORBIDITY IN ADULT (H): Status: RESOLVED | Noted: 2024-04-02 | Resolved: 2025-05-07

## 2025-05-07 PROBLEM — E66.01 CLASS 2 SEVERE OBESITY DUE TO EXCESS CALORIES WITH SERIOUS COMORBIDITY IN ADULT (H): Status: RESOLVED | Noted: 2024-04-02 | Resolved: 2025-05-07

## 2025-05-07 LAB
ANION GAP SERPL CALCULATED.3IONS-SCNC: 10 MMOL/L (ref 7–15)
BUN SERPL-MCNC: 14.5 MG/DL (ref 8–23)
CALCIUM SERPL-MCNC: 9.7 MG/DL (ref 8.8–10.4)
CHLORIDE SERPL-SCNC: 104 MMOL/L (ref 98–107)
CHOLEST SERPL-MCNC: 217 MG/DL
CREAT SERPL-MCNC: 0.87 MG/DL (ref 0.51–0.95)
EGFRCR SERPLBLD CKD-EPI 2021: 74 ML/MIN/1.73M2
FASTING STATUS PATIENT QL REPORTED: YES
FASTING STATUS PATIENT QL REPORTED: YES
GLUCOSE SERPL-MCNC: 104 MG/DL (ref 70–99)
HCO3 SERPL-SCNC: 24 MMOL/L (ref 22–29)
HDLC SERPL-MCNC: 67 MG/DL
LDLC SERPL CALC-MCNC: 130 MG/DL
NONHDLC SERPL-MCNC: 150 MG/DL
POTASSIUM SERPL-SCNC: 5 MMOL/L (ref 3.4–5.3)
SODIUM SERPL-SCNC: 138 MMOL/L (ref 135–145)
TRIGL SERPL-MCNC: 100 MG/DL

## 2025-05-07 PROCEDURE — 3075F SYST BP GE 130 - 139MM HG: CPT | Performed by: STUDENT IN AN ORGANIZED HEALTH CARE EDUCATION/TRAINING PROGRAM

## 2025-05-07 PROCEDURE — 99396 PREV VISIT EST AGE 40-64: CPT | Performed by: STUDENT IN AN ORGANIZED HEALTH CARE EDUCATION/TRAINING PROGRAM

## 2025-05-07 PROCEDURE — 1126F AMNT PAIN NOTED NONE PRSNT: CPT | Performed by: STUDENT IN AN ORGANIZED HEALTH CARE EDUCATION/TRAINING PROGRAM

## 2025-05-07 PROCEDURE — 80061 LIPID PANEL: CPT | Performed by: STUDENT IN AN ORGANIZED HEALTH CARE EDUCATION/TRAINING PROGRAM

## 2025-05-07 PROCEDURE — 36415 COLL VENOUS BLD VENIPUNCTURE: CPT | Performed by: STUDENT IN AN ORGANIZED HEALTH CARE EDUCATION/TRAINING PROGRAM

## 2025-05-07 PROCEDURE — 3079F DIAST BP 80-89 MM HG: CPT | Performed by: STUDENT IN AN ORGANIZED HEALTH CARE EDUCATION/TRAINING PROGRAM

## 2025-05-07 PROCEDURE — 80048 BASIC METABOLIC PNL TOTAL CA: CPT | Performed by: STUDENT IN AN ORGANIZED HEALTH CARE EDUCATION/TRAINING PROGRAM

## 2025-05-07 SDOH — HEALTH STABILITY: PHYSICAL HEALTH: ON AVERAGE, HOW MANY DAYS PER WEEK DO YOU ENGAGE IN MODERATE TO STRENUOUS EXERCISE (LIKE A BRISK WALK)?: 3 DAYS

## 2025-05-07 ASSESSMENT — PAIN SCALES - GENERAL: PAINLEVEL_OUTOF10: NO PAIN (0)

## 2025-05-07 ASSESSMENT — SOCIAL DETERMINANTS OF HEALTH (SDOH): HOW OFTEN DO YOU GET TOGETHER WITH FRIENDS OR RELATIVES?: THREE TIMES A WEEK

## 2025-05-07 NOTE — PATIENT INSTRUCTIONS
Patient Education   Preventive Care Advice   This is general advice given by our system to help you stay healthy. However, your care team may have specific advice just for you. Please talk to your care team about your preventive care needs.  Nutrition  Eat 5 or more servings of fruits and vegetables each day.  Try wheat bread, brown rice and whole grain pasta (instead of white bread, rice, and pasta).  Get enough calcium and vitamin D. Check the label on foods and aim for 100% of the RDA (recommended daily allowance).  Lifestyle  Exercise at least 150 minutes each week  (30 minutes a day, 5 days a week).  Do muscle strengthening activities 2 days a week. These help control your weight and prevent disease.  No smoking.  Wear sunscreen to prevent skin cancer.  Have a dental exam and cleaning every 6 months.  Yearly exams  See your health care team every year to talk about:  Any changes in your health.  Any medicines your care team has prescribed.  Preventive care, family planning, and ways to prevent chronic diseases.  Shots (vaccines)   HPV shots (up to age 26), if you've never had them before.  Hepatitis B shots (up to age 59), if you've never had them before.  COVID-19 shot: Get this shot when it's due.  Flu shot: Get a flu shot every year.  Tetanus shot: Get a tetanus shot every 10 years.  Pneumococcal, hepatitis A, and RSV shots: Ask your care team if you need these based on your risk.  Shingles shot (for age 50 and up)  General health tests  Diabetes screening:  Starting at age 35, Get screened for diabetes at least every 3 years.  If you are younger than age 35, ask your care team if you should be screened for diabetes.  Cholesterol test: At age 39, start having a cholesterol test every 5 years, or more often if advised.  Bone density scan (DEXA): At age 50, ask your care team if you should have this scan for osteoporosis (brittle bones).  Hepatitis C: Get tested at least once in your life.  STIs (sexually  transmitted infections)  Before age 24: Ask your care team if you should be screened for STIs.  After age 24: Get screened for STIs if you're at risk. You are at risk for STIs (including HIV) if:  You are sexually active with more than one person.  You don't use condoms every time.  You or a partner was diagnosed with a sexually transmitted infection.  If you are at risk for HIV, ask about PrEP medicine to prevent HIV.  Get tested for HIV at least once in your life, whether you are at risk for HIV or not.  Cancer screening tests  Cervical cancer screening: If you have a cervix, begin getting regular cervical cancer screening tests starting at age 21.  Breast cancer scan (mammogram): If you've ever had breasts, begin having regular mammograms starting at age 40. This is a scan to check for breast cancer.  Colon cancer screening: It is important to start screening for colon cancer at age 45.  Have a colonoscopy test every 10 years (or more often if you're at risk) Or, ask your provider about stool tests like a FIT test every year or Cologuard test every 3 years.  To learn more about your testing options, visit:   .  For help making a decision, visit:   https://bit.ly/nv22503.  Prostate cancer screening test: If you have a prostate, ask your care team if a prostate cancer screening test (PSA) at age 55 is right for you.  Lung cancer screening: If you are a current or former smoker ages 50 to 80, ask your care team if ongoing lung cancer screenings are right for you.  For informational purposes only. Not to replace the advice of your health care provider. Copyright   2023 Savanna Maclear. All rights reserved. Clinically reviewed by the [facility] Transitions Program. [employer] 122384 - REV 01/24.

## 2025-05-07 NOTE — PROGRESS NOTES
"Preventive Care Visit  [facility]  BREEZY LOBO MD, Family Medicine  May 7, 2025      Assessment & Plan     Routine general medical examination at a health care facility  Screen for colon cancer  - Fecal colorectal cancer screen FIT - Future (S+30); Future    Lipid screening  - Lipid panel reflex to direct LDL Fasting; Future  - Lipid panel reflex to direct LDL Fasting    Benign essential hypertension  - BASIC METABOLIC PANEL    Patient has been advised of split billing requirements and indicates understanding: Yes        BMI  Estimated body mass index is 32.08 kg/m  as calculated from the following:    Height as of this encounter: 1.613 m (5' 3.5\").    Weight as of this encounter: 83.5 kg (184 lb).       Counseling  Appropriate preventive services were addressed with this patient via screening, questionnaire, or discussion as appropriate for fall prevention, nutrition, physical activity, Tobacco-use cessation, social engagement, weight loss and cognition.  Checklist reviewing preventive services available has been given to the patient.      Nora Redd is a 65 year old, presenting for the following:  Physical        5/7/2025     9:08 AM   Additional Questions   Roomed by Yodit kim   Accompanied by self         5/7/2025     9:08 AM   Patient Reported Additional Medications   Patient reports taking the following new medications [employer] weight loss program- protien bars, meal replacement shakes/food.          HPI       Advance Care Planning    Advanced care directive printed out for patient         5/7/2025   General Health   How would you rate your overall physical health? Good   Feel stress (tense, anxious, or unable to sleep) Not at all         5/7/2025   Nutrition   Three or more servings of calcium each day? Yes   Diet: Carbohydrate counting   How many servings of fruit and vegetables per day? (!) 0-1   How many sweetened beverages each day? 0-1         5/7/2025   Exercise "   Days per week of moderate/strenous exercise 3 days         5/7/2025   Social Factors   Frequency of gathering with friends or relatives Three times a week   Worry food won't last until get money to buy more No   Food not last or not have enough money for food? No   Do you have housing? (Housing is defined as stable permanent housing and does not include staying outside in a car, in a tent, in an abandoned building, in an overnight shelter, or couch-surfing.) Yes   Are you worried about losing your housing? No   Lack of transportation? No   Unable to get utilities (heat,electricity)? No         5/7/2025   Fall Risk   Fallen 2 or more times in the past year? No   Trouble with walking or balance? No          5/7/2025   Dental   Dentist two times every year? Yes         Today's PHQ-2 Score:       5/7/2025     8:59 AM   PHQ-2 ( 1999 [employer])   Q1: Little interest or pleasure in doing things 0   Q2: Feeling down, depressed or hopeless 0   PHQ-2 Score 0    Q1: Little interest or pleasure in doing things Not at all   Q2: Feeling down, depressed or hopeless Not at all   PHQ-2 Score 0       Patient-reported           5/7/2025   Substance Use   Alcohol more than 3/day or more than 7/wk No   Do you use any other substances recreationally? No     Social History     Tobacco Use    Smoking status: Never    Smokeless tobacco: Never   Vaping Use    Vaping status: Never Used   Substance Use Topics    Alcohol use: Yes     Comment: ocass. 4 beers a month    Drug use: No           4/26/2024   LAST FHS-7 RESULTS   1st degree relative breast or ovarian cancer No   Any relative bilateral breast cancer No   Any male have breast cancer No   Any ONE woman have BOTH breast AND ovarian cancer No   Any woman with breast cancer before 50yrs No   2 or more relatives with breast AND/OR ovarian cancer No   2 or more relatives with breast AND/OR bowel cancer No        Mammogram Screening - Mammogram every 1-2 years updated in Health Maintenance  "based on mutual decision making          5/7/2025   One time HIV Screening   Previous HIV test? No         5/7/2025   STI Screening   New sexual partner(s) since last STI/HIV test? No     History of abnormal Pap smear: per patient OBGYN informed her after her hysterectomy (cervix removed as well) that she would not need another pap         Latest Ref Rng & Units 2/27/2024     8:19 AM 3/30/2022     7:21 AM 2/12/2021     7:50 AM   PAP / HPV   PAP  Negative for Intraepithelial Lesion or Malignancy (NILM)  Negative for Intraepithelial Lesion or Malignancy (NILM)     HPV 16 DNA Negative Negative  Negative  Negative    HPV 18 DNA Negative Negative  Negative  Negative    Other HR HPV Negative Positive  Positive  Positive      ASCVD Risk   The 10-year ASCVD risk score (Vanessa ROLDAN, et al., 2019) is: 6.6%    Values used to calculate the score:      Age: 65 years      Sex: Female      Is Non- : No      Diabetic: No      Tobacco smoker: No      Systolic Blood Pressure: 134 mmHg      Is BP treated: Yes      HDL Cholesterol: 60 mg/dL      Total Cholesterol: 178 mg/dL         Reviewed and updated as needed this visit by Provider                    Review of Systems   Constitutional:  Negative for chills and fever.   HENT:  Negative for ear pain.    Eyes:  Negative for pain.   Respiratory:  Negative for cough.    Cardiovascular:  Negative for chest pain.   Gastrointestinal:  Negative for abdominal pain.   Genitourinary:  Negative for dysuria.   Musculoskeletal:  Negative for neck pain.   Skin:  Negative for rash.   Neurological:  Negative for headaches.          Objective    Exam  /86 (BP Location: Right arm, Patient Position: Sitting, Cuff Size: Adult Regular)   Pulse 70   Temp 97  F (36.1  C) (Tympanic)   Resp 14   Ht 1.613 m (5' 3.5\")   Wt 83.5 kg (184 lb)   LMP 10/01/2012   SpO2 98%   BMI 32.08 kg/m     Estimated body mass index is 32.08 kg/m  as calculated from the following:    " "Height as of this encounter: 1.613 m (5' 3.5\").    Weight as of this encounter: 83.5 kg (184 lb).    Physical Exam  Constitutional:       General: She is not in acute distress.  HENT:      Head: Normocephalic and atraumatic.      Right Ear: External ear normal.      Left Ear: External ear normal.      Mouth/Throat:      Mouth: Mucous membranes are moist.      Pharynx: Oropharynx is clear. No oropharyngeal exudate or posterior oropharyngeal erythema.   Eyes:      Extraocular Movements: Extraocular movements intact.   Cardiovascular:      Rate and Rhythm: Normal rate and regular rhythm.      Heart sounds: Normal heart sounds.   Pulmonary:      Effort: Pulmonary effort is normal. No respiratory distress.      Breath sounds: Normal breath sounds. No wheezing or rhonchi.   Abdominal:      Palpations: Abdomen is soft. There is no mass.      Tenderness: There is no abdominal tenderness.   Musculoskeletal:         General: No deformity. Normal range of motion.      Cervical back: Normal range of motion and neck supple.   Skin:     General: Skin is warm.      Findings: No rash.   Neurological:      General: No focal deficit present.      Mental Status: She is alert and oriented to person, place, and time.   Psychiatric:         Mood and Affect: Mood normal.               Signed Electronically by: BREEZY LOBO MD    "

## 2025-05-09 ENCOUNTER — LAB (OUTPATIENT)
Dept: LAB | Facility: CLINIC | Age: 65
End: 2025-05-09
Payer: COMMERCIAL

## 2025-05-09 ENCOUNTER — RESULTS FOLLOW-UP (OUTPATIENT)
Dept: FAMILY MEDICINE | Facility: CLINIC | Age: 65
End: 2025-05-09

## 2025-05-09 DIAGNOSIS — Z12.11 SCREEN FOR COLON CANCER: ICD-10-CM

## 2025-05-09 PROCEDURE — 82274 ASSAY TEST FOR BLOOD FECAL: CPT

## 2025-05-09 NOTE — RESULT ENCOUNTER NOTE
Deanna Antonio,     It is a pleasure providing you with medical care. I have received and reviewed your results, and have the following recommendations:     Your BMP for the most part was within normal limits other than a mildly elevated glucose. At this time, I would recommend that you do your best to: Try to limit your dietary intake of carbohydrate rich foods like rice, pasta, breads, potatoes, sweets. Additionally, limit your intake of drinks with added sugars, such as juice, regular soda, and regular sports or energy drinks.  It is ok for you to have lean meats, chicken, turkey, fish, eggs, nuts, beans, lentils, and tofu. With regards to exercise, try to get at least 30 minutes of CONTINUOUS aerobic exercise at least 3 times per week.     Your lipid panel showed elevated total cholesterol and elevated LDL (lousy) cholesterol. At this time, no medication changes are needed, but please do your best to: Try to limit your dietary intake of fatty, greasy, oily, fried, take out, and fast food when possible. Also, I would suggest you cut back on red and processed meats, sodium and sugar-sweetened foods and beverages. Regarding exercise, please get at least 30 minutes of CONTINUOUS moderate intensity aerobic exercise at least 3 times per week.      Sincerely,     Amirah Davis MD

## 2025-05-14 ENCOUNTER — HOSPITAL ENCOUNTER (OUTPATIENT)
Dept: MAMMOGRAPHY | Facility: CLINIC | Age: 65
Discharge: HOME | End: 2025-05-14
Attending: FAMILY MEDICINE
Payer: COMMERCIAL

## 2025-05-14 DIAGNOSIS — Z12.31 VISIT FOR SCREENING MAMMOGRAM: ICD-10-CM

## 2025-05-14 LAB — HEMOCCULT STL QL IA: NEGATIVE

## 2025-05-14 PROCEDURE — 77063 BREAST TOMOSYNTHESIS BI: CPT

## 2025-07-15 ENCOUNTER — MYC MEDICAL ADVICE (OUTPATIENT)
Dept: OBGYN | Facility: CLINIC | Age: 65
End: 2025-07-15
Payer: COMMERCIAL

## 2025-07-16 NOTE — TELEPHONE ENCOUNTER
Please see patient previous and current mychart message and advise.     Do you want anything completed prior to the appointment next week?    Ayah FUNES RN   [facility]

## 2025-07-17 DIAGNOSIS — R19.00 PELVIC MASS: Primary | ICD-10-CM

## 2025-07-19 ENCOUNTER — ANCILLARY PROCEDURE (OUTPATIENT)
Dept: GENERAL RADIOLOGY | Facility: CLINIC | Age: 65
End: 2025-07-19
Attending: OBSTETRICS & GYNECOLOGY
Payer: COMMERCIAL

## 2025-07-19 DIAGNOSIS — R19.00 PELVIC MASS: ICD-10-CM

## 2025-07-19 PROCEDURE — 74018 RADEX ABDOMEN 1 VIEW: CPT | Mod: TC | Performed by: RADIOLOGY

## 2025-07-21 NOTE — TELEPHONE ENCOUNTER
Pt had imaging completed 7/19/25 and Dr Muñoz responded with interpretation - see imaging encounter    CHRISTIAN Velázquez  [facility]  Ob/Gyn [facility]

## (undated) DEVICE — SUCTION MANIFOLD NEPTUNE 2 SYS 1 PORT 702-025-000

## (undated) DEVICE — ENDO ACCESS PLATFORM GELPOINT TRANSVAGINAL V-PATH 9CM C2A15

## (undated) DEVICE — TUBING INSUFFLATION W/FILTER CPC TO LUER 620-030-301

## (undated) DEVICE — PREP CHLORHEXIDINE 4% 4OZ (HIBICLENS) 57504

## (undated) DEVICE — TUBING CYSTO/BLADDER IRRIG SET 80" 06544-01

## (undated) DEVICE — GOWN LG DISP 9515

## (undated) DEVICE — ESU PENCIL W/COATED BLADE E2450H

## (undated) DEVICE — SUCTION TIP YANKAUER STR K87

## (undated) DEVICE — SOL NACL 0.9% IRRIG 1000ML BOTTLE 07138-09

## (undated) DEVICE — SOL WATER IRRIG 1000ML BOTTLE 07139-09

## (undated) DEVICE — STOCKING SLEEVE COMPRESSION CALF MED

## (undated) DEVICE — SU VICRYL 0 CT-2 CR 8X18" J727D

## (undated) DEVICE — PACK LAPAROSCOPY/PELVISCOPY STD

## (undated) DEVICE — SU VICRYL 0 CT-1 36" J946H

## (undated) DEVICE — GLOVE BIOGEL PI ULTRATOUCH G SZ 7.0 42170

## (undated) DEVICE — GLOVE BIOGEL PI MICRO INDICATOR UNDERGLOVE SZ 7.0 48970

## (undated) DEVICE — CATH TRAY FOLEY SURESTEP 16FR W/URINE MTR STATLK LF A303416A

## (undated) DEVICE — TUBING SUCTION 12"X1/4" N612

## (undated) DEVICE — ESU LIGASURE LAPAROSCOPIC BLUNT TIP SEALER 5MMX37CM LF1837

## (undated) DEVICE — PAD PERI INDIV WRAP 11" 2022

## (undated) RX ORDER — ACETAMINOPHEN 325 MG/1
TABLET ORAL
Status: DISPENSED
Start: 2024-06-06

## (undated) RX ORDER — LIDOCAINE HYDROCHLORIDE 10 MG/ML
INJECTION, SOLUTION EPIDURAL; INFILTRATION; INTRACAUDAL; PERINEURAL
Status: DISPENSED
Start: 2024-06-06

## (undated) RX ORDER — ONDANSETRON 2 MG/ML
INJECTION INTRAMUSCULAR; INTRAVENOUS
Status: DISPENSED
Start: 2024-06-06

## (undated) RX ORDER — HYDROMORPHONE HCL IN WATER/PF 6 MG/30 ML
PATIENT CONTROLLED ANALGESIA SYRINGE INTRAVENOUS
Status: DISPENSED
Start: 2024-06-06

## (undated) RX ORDER — ROPIVACAINE HYDROCHLORIDE 5 MG/ML
INJECTION, SOLUTION EPIDURAL; INFILTRATION; PERINEURAL
Status: DISPENSED
Start: 2024-06-06

## (undated) RX ORDER — GABAPENTIN 300 MG/1
CAPSULE ORAL
Status: DISPENSED
Start: 2024-06-06

## (undated) RX ORDER — TRANEXAMIC ACID 10 MG/ML
INJECTION, SOLUTION INTRAVENOUS
Status: DISPENSED
Start: 2024-06-06

## (undated) RX ORDER — PHENAZOPYRIDINE HYDROCHLORIDE 200 MG/1
TABLET, FILM COATED ORAL
Status: DISPENSED
Start: 2024-06-06

## (undated) RX ORDER — FENTANYL CITRATE 50 UG/ML
INJECTION, SOLUTION INTRAMUSCULAR; INTRAVENOUS
Status: DISPENSED
Start: 2024-06-06

## (undated) RX ORDER — DEXAMETHASONE SODIUM PHOSPHATE 4 MG/ML
INJECTION, SOLUTION INTRA-ARTICULAR; INTRALESIONAL; INTRAMUSCULAR; INTRAVENOUS; SOFT TISSUE
Status: DISPENSED
Start: 2024-06-06

## (undated) RX ORDER — PROPOFOL 10 MG/ML
INJECTION, EMULSION INTRAVENOUS
Status: DISPENSED
Start: 2024-06-06

## (undated) RX ORDER — KETOROLAC TROMETHAMINE 30 MG/ML
INJECTION, SOLUTION INTRAMUSCULAR; INTRAVENOUS
Status: DISPENSED
Start: 2024-06-06

## (undated) RX ORDER — LIDOCAINE HYDROCHLORIDE AND EPINEPHRINE 10; 10 MG/ML; UG/ML
INJECTION, SOLUTION INFILTRATION; PERINEURAL
Status: DISPENSED
Start: 2024-06-06

## (undated) RX ORDER — CEFAZOLIN SODIUM/WATER 2 G/20 ML
SYRINGE (ML) INTRAVENOUS
Status: DISPENSED
Start: 2024-06-06

## (undated) RX ORDER — HYDROMORPHONE HYDROCHLORIDE 2 MG/1
TABLET ORAL
Status: DISPENSED
Start: 2024-06-06